# Patient Record
Sex: FEMALE | Race: WHITE | HISPANIC OR LATINO | Employment: FULL TIME | URBAN - METROPOLITAN AREA
[De-identification: names, ages, dates, MRNs, and addresses within clinical notes are randomized per-mention and may not be internally consistent; named-entity substitution may affect disease eponyms.]

---

## 2017-02-19 ENCOUNTER — ANESTHESIA EVENT (OUTPATIENT)
Dept: ENDOSCOPY | Facility: HOSPITAL | Age: 37
DRG: 872 | End: 2017-02-19
Payer: MEDICAID

## 2017-02-19 ENCOUNTER — SURGERY (OUTPATIENT)
Age: 37
End: 2017-02-19

## 2017-02-19 ENCOUNTER — ANESTHESIA (OUTPATIENT)
Dept: ENDOSCOPY | Facility: HOSPITAL | Age: 37
DRG: 872 | End: 2017-02-19
Payer: MEDICAID

## 2017-02-19 ENCOUNTER — HOSPITAL ENCOUNTER (INPATIENT)
Facility: HOSPITAL | Age: 37
LOS: 2 days | Discharge: HOME | DRG: 872 | End: 2017-02-21
Attending: HOSPITALIST | Admitting: HOSPITALIST
Payer: MEDICAID

## 2017-02-19 DIAGNOSIS — K80.51: ICD-10-CM

## 2017-02-19 DIAGNOSIS — K83.09 CHOLANGITIS: Primary | ICD-10-CM

## 2017-02-19 PROBLEM — A41.9 SEPSIS: Status: ACTIVE | Noted: 2017-02-19

## 2017-02-19 PROBLEM — R74.8 ABNORMAL LIVER ENZYMES: Status: ACTIVE | Noted: 2017-02-19

## 2017-02-19 LAB
ALBUMIN SERPL BCP-MCNC: 3.3 G/DL
ALP SERPL-CCNC: 96 U/L
ALT SERPL W/O P-5'-P-CCNC: 281 U/L
ANION GAP SERPL CALC-SCNC: 12 MMOL/L
AST SERPL-CCNC: 99 U/L
BASOPHILS # BLD AUTO: 0.01 K/UL
BASOPHILS NFR BLD: 0.1 %
BILIRUB DIRECT SERPL-MCNC: 4 MG/DL
BILIRUB SERPL-MCNC: 5.8 MG/DL
BUN SERPL-MCNC: 4 MG/DL
CALCIUM SERPL-MCNC: 8.8 MG/DL
CHLORIDE SERPL-SCNC: 104 MMOL/L
CO2 SERPL-SCNC: 19 MMOL/L
CREAT SERPL-MCNC: 0.7 MG/DL
DIFFERENTIAL METHOD: ABNORMAL
EOSINOPHIL # BLD AUTO: 0 K/UL
EOSINOPHIL NFR BLD: 0.1 %
ERYTHROCYTE [DISTWIDTH] IN BLOOD BY AUTOMATED COUNT: 13.6 %
EST. GFR  (AFRICAN AMERICAN): >60 ML/MIN/1.73 M^2
EST. GFR  (NON AFRICAN AMERICAN): >60 ML/MIN/1.73 M^2
GLUCOSE SERPL-MCNC: 124 MG/DL
HCT VFR BLD AUTO: 37.9 %
HGB BLD-MCNC: 12.8 G/DL
INR PPP: 1.1
LACTATE SERPL-SCNC: 1.4 MMOL/L
LYMPHOCYTES # BLD AUTO: 0.4 K/UL
LYMPHOCYTES NFR BLD: 3.7 %
MAGNESIUM SERPL-MCNC: 1.8 MG/DL
MCH RBC QN AUTO: 27.8 PG
MCHC RBC AUTO-ENTMCNC: 33.8 %
MCV RBC AUTO: 82 FL
MONOCYTES # BLD AUTO: 1 K/UL
MONOCYTES NFR BLD: 8.2 %
NEUTROPHILS # BLD AUTO: 10.4 K/UL
NEUTROPHILS NFR BLD: 87.6 %
PHOSPHATE SERPL-MCNC: 2 MG/DL
PLATELET # BLD AUTO: 144 K/UL
PMV BLD AUTO: 11.2 FL
POTASSIUM SERPL-SCNC: 3.4 MMOL/L
PROCALCITONIN SERPL IA-MCNC: 0.8 NG/ML
PROT SERPL-MCNC: 7.2 G/DL
PROTHROMBIN TIME: 11.8 SEC
RBC # BLD AUTO: 4.6 M/UL
SODIUM SERPL-SCNC: 135 MMOL/L
WBC # BLD AUTO: 11.9 K/UL

## 2017-02-19 PROCEDURE — 84100 ASSAY OF PHOSPHORUS: CPT

## 2017-02-19 PROCEDURE — 37000009 HC ANESTHESIA EA ADD 15 MINS: Performed by: INTERNAL MEDICINE

## 2017-02-19 PROCEDURE — 43262 ENDO CHOLANGIOPANCREATOGRAPH: CPT | Mod: ,,, | Performed by: INTERNAL MEDICINE

## 2017-02-19 PROCEDURE — 80048 BASIC METABOLIC PNL TOTAL CA: CPT

## 2017-02-19 PROCEDURE — 74328 X-RAY BILE DUCT ENDOSCOPY: CPT | Mod: 26,,, | Performed by: INTERNAL MEDICINE

## 2017-02-19 PROCEDURE — 80074 ACUTE HEPATITIS PANEL: CPT

## 2017-02-19 PROCEDURE — 87040 BLOOD CULTURE FOR BACTERIA: CPT

## 2017-02-19 PROCEDURE — 43264 ERCP REMOVE DUCT CALCULI: CPT | Mod: ,,, | Performed by: INTERNAL MEDICINE

## 2017-02-19 PROCEDURE — 37000008 HC ANESTHESIA 1ST 15 MINUTES: Performed by: INTERNAL MEDICINE

## 2017-02-19 PROCEDURE — 83735 ASSAY OF MAGNESIUM: CPT

## 2017-02-19 PROCEDURE — 36415 COLL VENOUS BLD VENIPUNCTURE: CPT

## 2017-02-19 PROCEDURE — 25000003 PHARM REV CODE 250: Performed by: NURSE PRACTITIONER

## 2017-02-19 PROCEDURE — 94761 N-INVAS EAR/PLS OXIMETRY MLT: CPT

## 2017-02-19 PROCEDURE — 43275 ERCP REMOVE FORGN BODY DUCT: CPT | Mod: ,,, | Performed by: INTERNAL MEDICINE

## 2017-02-19 PROCEDURE — 25000003 PHARM REV CODE 250: Performed by: HOSPITALIST

## 2017-02-19 PROCEDURE — 80076 HEPATIC FUNCTION PANEL: CPT

## 2017-02-19 PROCEDURE — 0FC98ZZ EXTIRPATION OF MATTER FROM COMMON BILE DUCT, VIA NATURAL OR ARTIFICIAL OPENING ENDOSCOPIC: ICD-10-PCS | Performed by: INTERNAL MEDICINE

## 2017-02-19 PROCEDURE — 84145 PROCALCITONIN (PCT): CPT

## 2017-02-19 PROCEDURE — 85610 PROTHROMBIN TIME: CPT

## 2017-02-19 PROCEDURE — 27200949 HC CANNULATOME: Performed by: INTERNAL MEDICINE

## 2017-02-19 PROCEDURE — 83605 ASSAY OF LACTIC ACID: CPT

## 2017-02-19 PROCEDURE — 63600175 PHARM REV CODE 636 W HCPCS: Performed by: HOSPITALIST

## 2017-02-19 PROCEDURE — 20000000 HC ICU ROOM

## 2017-02-19 PROCEDURE — C1769 GUIDE WIRE: HCPCS | Performed by: INTERNAL MEDICINE

## 2017-02-19 PROCEDURE — 63600175 PHARM REV CODE 636 W HCPCS: Performed by: ANESTHESIOLOGY

## 2017-02-19 PROCEDURE — 25000003 PHARM REV CODE 250: Performed by: ANESTHESIOLOGY

## 2017-02-19 PROCEDURE — 85025 COMPLETE CBC W/AUTO DIFF WBC: CPT

## 2017-02-19 PROCEDURE — 43264 ERCP REMOVE DUCT CALCULI: CPT | Performed by: INTERNAL MEDICINE

## 2017-02-19 PROCEDURE — 43275 ERCP REMOVE FORGN BODY DUCT: CPT | Performed by: INTERNAL MEDICINE

## 2017-02-19 PROCEDURE — 27200999 HC RETRIEVAL BALLOON, ERCP: Performed by: INTERNAL MEDICINE

## 2017-02-19 RX ORDER — GLYCOPYRROLATE 0.2 MG/ML
INJECTION INTRAMUSCULAR; INTRAVENOUS
Status: DISCONTINUED | OUTPATIENT
Start: 2017-02-19 | End: 2017-02-19

## 2017-02-19 RX ORDER — ROCURONIUM BROMIDE 10 MG/ML
INJECTION, SOLUTION INTRAVENOUS
Status: DISCONTINUED | OUTPATIENT
Start: 2017-02-19 | End: 2017-02-19

## 2017-02-19 RX ORDER — MIDAZOLAM HYDROCHLORIDE 1 MG/ML
INJECTION, SOLUTION INTRAMUSCULAR; INTRAVENOUS
Status: DISCONTINUED | OUTPATIENT
Start: 2017-02-19 | End: 2017-02-19

## 2017-02-19 RX ORDER — FENTANYL CITRATE 50 UG/ML
INJECTION, SOLUTION INTRAMUSCULAR; INTRAVENOUS
Status: DISCONTINUED | OUTPATIENT
Start: 2017-02-19 | End: 2017-02-19

## 2017-02-19 RX ORDER — ONDANSETRON 2 MG/ML
4 INJECTION INTRAMUSCULAR; INTRAVENOUS EVERY 8 HOURS PRN
Status: DISCONTINUED | OUTPATIENT
Start: 2017-02-19 | End: 2017-02-21 | Stop reason: HOSPADM

## 2017-02-19 RX ORDER — POTASSIUM CHLORIDE 20 MEQ/1
20 TABLET, EXTENDED RELEASE ORAL ONCE
Status: COMPLETED | OUTPATIENT
Start: 2017-02-19 | End: 2017-02-19

## 2017-02-19 RX ORDER — POTASSIUM CHLORIDE 20 MEQ/1
20 TABLET, EXTENDED RELEASE ORAL DAILY
Status: DISCONTINUED | OUTPATIENT
Start: 2017-02-19 | End: 2017-02-19

## 2017-02-19 RX ORDER — SUCCINYLCHOLINE CHLORIDE 20 MG/ML
INJECTION INTRAMUSCULAR; INTRAVENOUS
Status: DISCONTINUED | OUTPATIENT
Start: 2017-02-19 | End: 2017-02-19

## 2017-02-19 RX ORDER — LIDOCAINE HYDROCHLORIDE 20 MG/ML
INJECTION, SOLUTION EPIDURAL; INFILTRATION; INTRACAUDAL; PERINEURAL
Status: DISCONTINUED | OUTPATIENT
Start: 2017-02-19 | End: 2017-02-19

## 2017-02-19 RX ORDER — URSODIOL 250 MG/1
250 TABLET, FILM COATED ORAL
Status: DISCONTINUED | OUTPATIENT
Start: 2017-02-19 | End: 2017-02-21 | Stop reason: HOSPADM

## 2017-02-19 RX ORDER — MORPHINE SULFATE 4 MG/ML
4 INJECTION, SOLUTION INTRAMUSCULAR; INTRAVENOUS EVERY 4 HOURS PRN
Status: DISCONTINUED | OUTPATIENT
Start: 2017-02-19 | End: 2017-02-21 | Stop reason: HOSPADM

## 2017-02-19 RX ORDER — PROPOFOL 10 MG/ML
VIAL (ML) INTRAVENOUS
Status: DISCONTINUED | OUTPATIENT
Start: 2017-02-19 | End: 2017-02-19

## 2017-02-19 RX ORDER — MORPHINE SULFATE 2 MG/ML
2 INJECTION, SOLUTION INTRAMUSCULAR; INTRAVENOUS EVERY 4 HOURS PRN
Status: DISCONTINUED | OUTPATIENT
Start: 2017-02-19 | End: 2017-02-21 | Stop reason: HOSPADM

## 2017-02-19 RX ORDER — NEOSTIGMINE METHYLSULFATE 1 MG/ML
INJECTION, SOLUTION INTRAVENOUS
Status: DISCONTINUED | OUTPATIENT
Start: 2017-02-19 | End: 2017-02-19

## 2017-02-19 RX ORDER — ACETAMINOPHEN 325 MG/1
650 TABLET ORAL EVERY 4 HOURS PRN
Status: DISCONTINUED | OUTPATIENT
Start: 2017-02-19 | End: 2017-02-21 | Stop reason: HOSPADM

## 2017-02-19 RX ORDER — SODIUM CHLORIDE, SODIUM LACTATE, POTASSIUM CHLORIDE, CALCIUM CHLORIDE 600; 310; 30; 20 MG/100ML; MG/100ML; MG/100ML; MG/100ML
INJECTION, SOLUTION INTRAVENOUS CONTINUOUS PRN
Status: DISCONTINUED | OUTPATIENT
Start: 2017-02-19 | End: 2017-02-19

## 2017-02-19 RX ORDER — SODIUM CHLORIDE 9 MG/ML
INJECTION, SOLUTION INTRAVENOUS CONTINUOUS PRN
Status: DISCONTINUED | OUTPATIENT
Start: 2017-02-19 | End: 2017-02-19

## 2017-02-19 RX ORDER — SODIUM CHLORIDE 9 MG/ML
INJECTION, SOLUTION INTRAVENOUS CONTINUOUS
Status: DISCONTINUED | OUTPATIENT
Start: 2017-02-19 | End: 2017-02-20

## 2017-02-19 RX ORDER — HYDROMORPHONE HYDROCHLORIDE 2 MG/ML
INJECTION, SOLUTION INTRAMUSCULAR; INTRAVENOUS; SUBCUTANEOUS
Status: DISCONTINUED | OUTPATIENT
Start: 2017-02-19 | End: 2017-02-19

## 2017-02-19 RX ADMIN — MIDAZOLAM 2 MG: 1 INJECTION INTRAMUSCULAR; INTRAVENOUS at 12:02

## 2017-02-19 RX ADMIN — SODIUM CHLORIDE 1000 ML: 0.9 INJECTION, SOLUTION INTRAVENOUS at 07:02

## 2017-02-19 RX ADMIN — HYDROMORPHONE HYDROCHLORIDE 0.2 MG: 2 INJECTION INTRAMUSCULAR; INTRAVENOUS; SUBCUTANEOUS at 01:02

## 2017-02-19 RX ADMIN — ROCURONIUM BROMIDE 5 MG: 10 INJECTION, SOLUTION INTRAVENOUS at 01:02

## 2017-02-19 RX ADMIN — FENTANYL CITRATE 100 MCG: 50 INJECTION, SOLUTION INTRAMUSCULAR; INTRAVENOUS at 12:02

## 2017-02-19 RX ADMIN — HYDROMORPHONE HYDROCHLORIDE 0.2 MG: 2 INJECTION INTRAMUSCULAR; INTRAVENOUS; SUBCUTANEOUS at 02:02

## 2017-02-19 RX ADMIN — MORPHINE SULFATE 4 MG: 4 INJECTION, SOLUTION INTRAMUSCULAR; INTRAVENOUS at 08:02

## 2017-02-19 RX ADMIN — PROPOFOL 140 MG: 10 INJECTION, EMULSION INTRAVENOUS at 12:02

## 2017-02-19 RX ADMIN — ONDANSETRON 4 MG: 2 INJECTION INTRAMUSCULAR; INTRAVENOUS at 07:02

## 2017-02-19 RX ADMIN — ROCURONIUM BROMIDE 10 MG: 10 INJECTION, SOLUTION INTRAVENOUS at 01:02

## 2017-02-19 RX ADMIN — NEOSTIGMINE METHYLSULFATE 4 MG: 1 INJECTION INTRAVENOUS at 02:02

## 2017-02-19 RX ADMIN — GLYCOPYRROLATE 0.8 MG: 0.2 INJECTION, SOLUTION INTRAMUSCULAR; INTRAVENOUS at 02:02

## 2017-02-19 RX ADMIN — ACETAMINOPHEN 650 MG: 325 TABLET ORAL at 08:02

## 2017-02-19 RX ADMIN — PIPERACILLIN SODIUM AND TAZOBACTAM SODIUM 4.5 G: 4; .5 INJECTION, POWDER, FOR SOLUTION INTRAVENOUS at 08:02

## 2017-02-19 RX ADMIN — HYDROMORPHONE HYDROCHLORIDE 0.4 MG: 2 INJECTION INTRAMUSCULAR; INTRAVENOUS; SUBCUTANEOUS at 02:02

## 2017-02-19 RX ADMIN — MORPHINE SULFATE 2 MG: 2 INJECTION, SOLUTION INTRAMUSCULAR; INTRAVENOUS at 07:02

## 2017-02-19 RX ADMIN — PIPERACILLIN SODIUM AND TAZOBACTAM SODIUM 4.5 G: 4; .5 INJECTION, POWDER, FOR SOLUTION INTRAVENOUS at 03:02

## 2017-02-19 RX ADMIN — SODIUM CHLORIDE: 0.9 INJECTION, SOLUTION INTRAVENOUS at 12:02

## 2017-02-19 RX ADMIN — SODIUM CHLORIDE: 0.9 INJECTION, SOLUTION INTRAVENOUS at 10:02

## 2017-02-19 RX ADMIN — SUCCINYLCHOLINE CHLORIDE 140 MG: 20 INJECTION, SOLUTION INTRAMUSCULAR; INTRAVENOUS at 12:02

## 2017-02-19 RX ADMIN — LIDOCAINE HYDROCHLORIDE 100 MG: 20 INJECTION, SOLUTION EPIDURAL; INFILTRATION; INTRACAUDAL; PERINEURAL at 12:02

## 2017-02-19 RX ADMIN — HYDROMORPHONE HYDROCHLORIDE 0.6 MG: 2 INJECTION INTRAMUSCULAR; INTRAVENOUS; SUBCUTANEOUS at 02:02

## 2017-02-19 RX ADMIN — POTASSIUM CHLORIDE 20 MEQ: 20 TABLET, EXTENDED RELEASE ORAL at 11:02

## 2017-02-19 RX ADMIN — SODIUM CHLORIDE: 0.9 INJECTION, SOLUTION INTRAVENOUS at 11:02

## 2017-02-19 RX ADMIN — ROCURONIUM BROMIDE 15 MG: 10 INJECTION, SOLUTION INTRAVENOUS at 01:02

## 2017-02-19 RX ADMIN — SODIUM CHLORIDE, SODIUM LACTATE, POTASSIUM CHLORIDE, AND CALCIUM CHLORIDE: .6; .31; .03; .02 INJECTION, SOLUTION INTRAVENOUS at 01:02

## 2017-02-19 RX ADMIN — ACETAMINOPHEN 650 MG: 325 TABLET ORAL at 03:02

## 2017-02-19 RX ADMIN — SODIUM CHLORIDE: 0.9 INJECTION, SOLUTION INTRAVENOUS at 03:02

## 2017-02-19 RX ADMIN — ROCURONIUM BROMIDE 5 MG: 10 INJECTION, SOLUTION INTRAVENOUS at 12:02

## 2017-02-19 RX ADMIN — FENTANYL CITRATE 50 MCG: 50 INJECTION, SOLUTION INTRAMUSCULAR; INTRAVENOUS at 01:02

## 2017-02-19 NOTE — PLAN OF CARE
Patient arrived to unit via stretcher with EMS. Placed into bed 233. C/o abdominal and back pain 10/10. Dr. Sutton paged

## 2017-02-19 NOTE — SUBJECTIVE & OBJECTIVE
Past Medical History   Diagnosis Date    Calculus of bile duct with obstruction and without cholangitis or cholecystitis 2016    Choledocholithiasis     Cholelithiasis        Past Surgical History   Procedure Laterality Date    Cholecystectomy  2016     section      Endoscopic retrograde cholangiopancreatography w/ sphincterotomy and stone removal  2016       Review of patient's allergies indicates:  No Known Allergies    No current facility-administered medications on file prior to encounter.      Current Outpatient Prescriptions on File Prior to Encounter   Medication Sig    ondansetron (ZOFRAN-ODT) 4 MG TbDL Take 8 mg by mouth every 12 (twelve) hours.     Family History     Problem Relation (Age of Onset)    Gallbladder disease         Social History Main Topics    Smoking status: Never Smoker    Smokeless tobacco: Never Used    Alcohol use No    Drug use: No    Sexual activity: Yes     Partners: Male     Review of Systems   Constitutional: Positive for appetite change and fever. Negative for chills and diaphoresis.   HENT: Negative for sore throat and trouble swallowing.    Eyes: Negative for photophobia and visual disturbance.   Respiratory: Negative for cough, shortness of breath and wheezing.    Cardiovascular: Negative for chest pain and palpitations.   Gastrointestinal: Positive for abdominal pain, nausea and vomiting (Gold in color). Negative for constipation and diarrhea.   Endocrine: Negative for polydipsia and polyphagia.   Genitourinary: Negative for decreased urine volume, dysuria, hematuria and urgency.   Musculoskeletal: Negative for joint swelling, neck pain and neck stiffness.   Neurological: Negative for weakness, numbness and headaches.   Psychiatric/Behavioral: Negative for agitation, dysphoric mood and suicidal ideas.     Objective:     Vital Signs (Most Recent):  Temp: (!) 102.6 °F (39.2 °C) (17 0824)  Pulse: (!) 135 (17 0645)  Resp: 20 (17  0645)  BP: 120/75 (02/19/17 0645)  SpO2: 97 % (02/19/17 0758) Vital Signs (24h Range):  Temp:  [102.3 °F (39.1 °C)-102.6 °F (39.2 °C)] 102.6 °F (39.2 °C)  Pulse:  [135] 135  Resp:  [20] 20  SpO2:  [97 %] 97 %  BP: (120)/(75) 120/75     Weight: 104.8 kg (231 lb)  Body mass index is 42.25 kg/(m^2).    Physical Exam   Constitutional: She is oriented to person, place, and time. She appears well-developed and well-nourished. No distress.   HENT:   Head: Normocephalic and atraumatic.   Mouth/Throat: Oropharynx is clear and moist. No oropharyngeal exudate.   Eyes: Conjunctivae are normal. Pupils are equal, round, and reactive to light. No scleral icterus.   Neck: Neck supple.   Cardiovascular: Normal rate, regular rhythm, normal heart sounds and intact distal pulses.  Exam reveals no gallop and no friction rub.    No murmur heard.  Pulmonary/Chest: Effort normal and breath sounds normal. No respiratory distress. She has no wheezes. She has no rales.   Abdominal: Soft. Bowel sounds are normal. She exhibits no distension. There is tenderness. There is guarding. There is no rebound.   Musculoskeletal: She exhibits no edema, tenderness or deformity.   Neurological: She is alert and oriented to person, place, and time. She exhibits normal muscle tone.   Skin: Skin is warm and dry. No rash noted. She is not diaphoretic.   Psychiatric: She has a normal mood and affect. Her behavior is normal.   Nursing note and vitals reviewed.       Significant Labs:   CBC:   Recent Labs  Lab 02/19/17  0756   WBC 11.90   HGB 12.8   HCT 37.9   *     CMP:   Recent Labs  Lab 02/19/17  0756   *   K 3.4*      CO2 19*   *   BUN 4*   CREATININE 0.7   CALCIUM 8.8   PROT 7.2   ALBUMIN 3.3*   BILITOT 5.8*   ALKPHOS 96   AST 99*   *   ANIONGAP 12   EGFRNONAA >60     Coagulation:   Recent Labs  Lab 02/19/17  0758   INR 1.1       Significant Imaging:   Imaging Report From Outside Facility:  CT Abdomen  2/19/17  Impressions:  1. Internal biliary stent, partially migrated, is within the distal common bile duct and 2nd portion of the duodenum.  There is mild intra-and extra hepatic biliary ductal dilation. Consider stent occlusion resulting in biliary obstruction.  2. No abdominal pelvic inflammation, free fluid, free air, or bowel obstruction. Normal appendix  Electronically signed by Eugene Fang MD

## 2017-02-19 NOTE — PLAN OF CARE
02/19/17 1142   Discharge Assessment   Assessment Type Discharge Planning Assessment   Confirmed/corrected address and phone number on facesheet? Yes   Assessment information obtained from? Patient   If Healthcare Directive is received, is it scanned into Epic? yes   Prior to hospitilization cognitive status: Alert/Oriented   Prior to hospitalization functional status: Independent   Current cognitive status: Alert/Oriented   Current Functional Status: Independent   Arrived From home or self-care   Lives With other (see comments)  (uncle)   Able to Return to Prior Arrangements yes   Is patient able to care for self after discharge? Yes   How many people do you have in your home that can help with your care after discharge? 1   Patient's perception of discharge disposition home or selfcare   Readmission Within The Last 30 Days no previous admission in last 30 days   Patient currently being followed by outpatient case management? No   Patient currently receives home health services? No   Does the patient currently use HME? No   Patient currently receives private duty nursing? No   Patient currently receives any other outside agency services? No   Equipment Currently Used at Home none   Do you have any problems affording any of your prescribed medications? Yes   If yes, what medications? all medication,    Is the patient taking medications as prescribed? no   Do you have any financial concerns preventing you from receiving the healthcare you need? Yes   Does the patient receive services at the [facility]? No   Are there any open cases? No   Discharge Plan A Home with family   Discharge Plan B Home with family   Patient/Family In Agreement With Plan yes

## 2017-02-19 NOTE — PLAN OF CARE
Problem: Patient Care Overview  Goal: Plan of Care Review  Outcome: Ongoing (interventions implemented as appropriate)  Pt on RA, SPO2 97 %.  Pt in no apparent respiratory distress. Will continue to monitor.

## 2017-02-19 NOTE — ANESTHESIA PREPROCEDURE EVALUATION
02/19/2017  Nel Ayala is a 36 y.o., female. For ercp due to bile duct stricture    [facility] Pre-op Assessment    I have reviewed the Patient Summary Reports.     I have reviewed the Nursing Notes.      Review of Systems  Anesthesia Hx:  No problems with previous Anesthesia Denies Hx of Anesthetic complications  History of prior surgery of interest to airway management or planning: Denies Family Hx of Anesthesia complications.   Denies Personal Hx of Anesthesia complications.   Social:  Non-Smoker, No Alcohol Use    Hematology/Oncology:  Hematology Normal        Cardiovascular:  Cardiovascular Normal Exercise tolerance: good     Pulmonary:  Pulmonary Normal    Renal/:  Renal/ Normal     Hepatic/GI:   Bile duct stricture   Musculoskeletal:  Musculoskeletal Normal    OB/GYN/PEDS:  upt pending   Neurological:  Neurology Normal    Endocrine:  Endocrine Normal        Physical Exam  General:  Obesity    Airway/Jaw/Neck:  Airway Findings: Mouth Opening: Normal Tongue: Normal  General Airway Assessment: Adult  Mallampati: III      Dental:  Dental Findings: In tact   Chest/Lungs:  Chest/Lungs Findings: Clear to auscultation, Normal Respiratory Rate     Heart/Vascular:  Heart Findings: Rate: Normal  Rhythm: Regular Rhythm  Sounds: Normal        Mental Status:  Mental Status Findings:  Cooperative, Alert and Oriented         Anesthesia Plan  Type of Anesthesia, risks & benefits discussed:  Anesthesia Type:  general  Patient's Preference:   Intra-op Monitoring Plan:   Intra-op Monitoring Plan Comments:   Post Op Pain Control Plan:   Post Op Pain Control Plan Comments:   Induction:   IV  Beta Blocker:         Informed Consent: Patient understands risks and agrees with Anesthesia plan.  Questions answered.   ASA Score: 2  emergent   Day of Surgery Review of History & Physical:    H&P update referred to the provider.          Ready For Surgery From Anesthesia Perspective.     Lab Results   Component Value Date    WBC 11.90 02/19/2017    HGB 12.8 02/19/2017    HCT 37.9 02/19/2017     (L) 02/19/2017     (H) 02/19/2017    AST 99 (H) 02/19/2017     (L) 02/19/2017    K 3.4 (L) 02/19/2017     02/19/2017    CREATININE 0.7 02/19/2017    BUN 4 (L) 02/19/2017    CO2 19 (L) 02/19/2017    INR 1.1 02/19/2017

## 2017-02-19 NOTE — LETTER
February 21, 2017         [address] [place] [address] [place] [address]  Phone: [phone]  Fax: [phone]       Patient: Nel Ayala   YOB: 1980  Date of Visit: 02/21/2017    To Whom It May Concern:    Please excuse Tio Kay who was with the above person who was hospitalized at [facility] on from 2/19/2017 -  02/21/2017. He may return to work on Wednesday, February 22 with no restrictions. If you have any questions or concerns, or if I can be of further assistance, please do not hesitate to contact me.    Sincerely,        Sandie Payne PA-C  [facility]  Pager: [phone]

## 2017-02-19 NOTE — H&P
[facility]  History & Physical    Patient Name: Nel Ayala  MRN: [id number]  Admission Date: 2/19/2017  Attending Physician: Johnathan Sutton MD   Primary Care Provider: Primary Doctor No         Patient information was obtained from patient, past medical records and ER records.     Subjective:     Principal Problem:Cholangitis    Chief Complaint: No chief complaint on file.       HPI: Nel Ayala is a 36 year old  women, mother of 3 children with a history of cholecystectomy, choledocholithiasis with temporary stent placement and no other medical history and takes no medications.  She takes depot-provera injections for birth control.  She has admitted in January 2016 for choledocholithiasis, s/p cholecystectomy 1/18/16 with J-P drain and subsequent elevated LFTs, underwent ERCP 1/19/16 with sphincterotomy and temporary plastic stent placement. She has been unable to follow up with  secondary to insurance problems.  She presented to [facility] ER with abdominal pain, nausea for 2 days and was found to have jaundice. Her bilirubin was 6, , , WBC 13K, Creatinine 0.7. Her vitals were 106/57, , Temp 99F. A CT scan was performed and it as reported that the CBD stent has migrated into the wrong position. She was given 2 liters of Normal Saline. She was transferred to [facility], admitted to [facility] for cholangitis with sepsis and elevated liver enzymes.    Upon arrival, her temperature was 102.3 F and HR is 130's (Sinus Tachycardia) and complaining of 10/10 abdominal pain.  She is given a 1 liter normal saline bolus, followed by 125 ml.hr and started on Zosyn and treated with zofran and morphine for pain and nausea.  GI consult today. Repeat labs are pending.       Past Medical History   Diagnosis Date    Calculus of bile duct with obstruction and without cholangitis or cholecystitis 1/20/2016     Choledocholithiasis     Cholelithiasis        Past Surgical History   Procedure Laterality Date    Cholecystectomy  2016     section      Endoscopic retrograde cholangiopancreatography w/ sphincterotomy and stone removal  2016       Review of patient's allergies indicates:  No Known Allergies    No current facility-administered medications on file prior to encounter.      Current Outpatient Prescriptions on File Prior to Encounter   Medication Sig    ondansetron (ZOFRAN-ODT) 4 MG TbDL Take 8 mg by mouth every 12 (twelve) hours.     Family History     Problem Relation (Age of Onset)    Gallbladder disease         Social History Main Topics    Smoking status: Never Smoker    Smokeless tobacco: Never Used    Alcohol use No    Drug use: No    Sexual activity: Yes     Partners: Male     Review of Systems   Constitutional: Positive for appetite change and fever. Negative for chills and diaphoresis.   HENT: Negative for sore throat and trouble swallowing.    Eyes: Negative for photophobia and visual disturbance.   Respiratory: Negative for cough, shortness of breath and wheezing.    Cardiovascular: Negative for chest pain and palpitations.   Gastrointestinal: Positive for abdominal pain, nausea and vomiting (Gold in color). Negative for constipation and diarrhea.   Endocrine: Negative for polydipsia and polyphagia.   Genitourinary: Negative for decreased urine volume, dysuria, hematuria and urgency.   Musculoskeletal: Negative for joint swelling, neck pain and neck stiffness.   Neurological: Negative for weakness, numbness and headaches.   Psychiatric/Behavioral: Negative for agitation, dysphoric mood and suicidal ideas.     Objective:     Vital Signs (Most Recent):  Temp: (!) 102.6 °F (39.2 °C) (17 0824)  Pulse: (!) 135 (17 0645)  Resp: 20 (17 0645)  BP: 120/75 (17 0645)  SpO2: 97 % (17 0758) Vital Signs (24h Range):  Temp:  [102.3 °F (39.1 °C)-102.6 °F (39.2 °C)]  102.6 °F (39.2 °C)  Pulse:  [135] 135  Resp:  [20] 20  SpO2:  [97 %] 97 %  BP: (120)/(75) 120/75     Weight: 104.8 kg (231 lb)  Body mass index is 42.25 kg/(m^2).    Physical Exam   Constitutional: She is oriented to person, place, and time. She appears well-developed and well-nourished. No distress.   HENT:   Head: Normocephalic and atraumatic.   Mouth/Throat: Oropharynx is clear and moist. No oropharyngeal exudate.   Eyes: Conjunctivae are normal. Pupils are equal, round, and reactive to light. No scleral icterus.   Neck: Neck supple.   Cardiovascular: Normal rate, regular rhythm, normal heart sounds and intact distal pulses.  Exam reveals no gallop and no friction rub.    No murmur heard.  Pulmonary/Chest: Effort normal and breath sounds normal. No respiratory distress. She has no wheezes. She has no rales.   Abdominal: Soft. Bowel sounds are normal. She exhibits no distension. There is tenderness. There is guarding. There is no rebound.   Musculoskeletal: She exhibits no edema, tenderness or deformity.   Neurological: She is alert and oriented to person, place, and time. She exhibits normal muscle tone.   Skin: Skin is warm and dry. No rash noted. She is not diaphoretic.   Psychiatric: She has a normal mood and affect. Her behavior is normal.   Nursing note and vitals reviewed.       Significant Labs:   CBC:   Recent Labs  Lab 02/19/17  0756   WBC 11.90   HGB 12.8   HCT 37.9   *     CMP:   Recent Labs  Lab 02/19/17  0756   *   K 3.4*      CO2 19*   *   BUN 4*   CREATININE 0.7   CALCIUM 8.8   PROT 7.2   ALBUMIN 3.3*   BILITOT 5.8*   ALKPHOS 96   AST 99*   *   ANIONGAP 12   EGFRNONAA >60     Coagulation:   Recent Labs  Lab 02/19/17  0758   INR 1.1       Significant Imaging:   Imaging Report From Outside Facility:  CT Abdomen 2/19/17  Impressions:  1. Internal biliary stent, partially migrated, is within the distal common bile duct and 2nd portion of the duodenum.  There is mild  intra-and extra hepatic biliary ductal dilation. Consider stent occlusion resulting in biliary obstruction.  2. No abdominal pelvic inflammation, free fluid, free air, or bowel obstruction. Normal appendix  Electronically signed by Eugene Fang MD    Assessment/Plan:     S/P cholecystectomy 1/18/16  , [facility]      Sepsis  Cholangitis, Abnormal Liver Enzymes  S/P Temporary Stent placment on 1/20/16 (Dr. Portillo) Patient unable to follow for ERCP due to financial concerns.  ABD CT shows stent migration with mild intra-and extra hepatic biliary ductal dilation.  Her bilirubin was 6, , , WBC 13K, Creatinine 0.7.  She was tachycardic with a temperature of 102.3, and 10/10 pain upon admits.  She is getting an additional liter of Normal Saline, then infusion at 125 ml/hr. NPO, PRN pain and nausea medicine. GI consult for ERCP.         VTE Risk Mitigation         Ordered     Medium Risk of VTE  Once      02/19/17 0717     Place sequential compression device  Until discontinued      02/19/17 0717     Place DARYA hose  Until discontinued      02/19/17 0717        Lissy Garcia NP  Department of Hospital Medicine   [facility]

## 2017-02-19 NOTE — CONSULTS
[facility]    CC: abdominal pain    HPI 36 y.o. female with PMH cholecystectomy, biliary stricture and choledocholithiasis s/p temporary CBD stent placement by Dr. Portillo 16 who presents with complaints of epigastric abdominal pain associated with nausea but no vomiting. She had a hospitalization in 2016 for choledocholithiasis and had CCY 16 and subsequent elevation in LFTs. She had ERCP on 16 and at that time the CBD stent was placed. She presented to an OSH with the abdominal pain, nausea and jaundice. Her bilirubin was elevated at 6, , . CT A/P was concerning for stent migration. She was transferred for concern for cholangitis given persistent fevers and tachycardia. She was placed on Zosyn and given total 3 L NS.       Past Medical History   Diagnosis Date    Calculus of bile duct with obstruction and without cholangitis or cholecystitis 2016    Choledocholithiasis     Cholelithiasis          Past Surgical History   Procedure Laterality Date    Cholecystectomy  2016     section      Endoscopic retrograde cholangiopancreatography w/ sphincterotomy and stone removal  2016       Social History  Social History   Substance Use Topics    Smoking status: Never Smoker    Smokeless tobacco: Never Used    Alcohol use No         Family History   Problem Relation Age of Onset    Gallbladder disease         Review of Systems  General ROS: negative for chills, fever or weight loss  Psychological ROS: negative for hallucination, depression or suicidal ideation  Ophthalmic ROS: negative for blurry vision, photophobia or eye pain  ENT ROS: negative for epistaxis, sore throat or rhinorrhea  Respiratory ROS: no cough, shortness of breath, or wheezing  Cardiovascular ROS: no chest pain or dyspnea on exertion  Gastrointestinal ROS: +abdominal pain, change in bowel habits, or black/ bloody stools  Genito-Urinary ROS: no dysuria, trouble voiding, or  "hematuria  Musculoskeletal ROS: negative for gait disturbance or muscular weakness  Neurological ROS: no syncope or seizures; no ataxia  Dermatological ROS: negative for pruritis, rash and jaundice    Physical Examination  Visit Vitals    /75    Pulse (!) 135    Temp (!) 102.6 °F (39.2 °C)    Resp 20    Ht 5' 2" (1.575 m)    Wt 104.8 kg (231 lb)    SpO2 97%    BMI 42.25 kg/m2     General appearance: alert, cooperative, no distress, +jaundice  HENT: Normocephalic, atraumatic, neck symmetrical, no nasal discharge   Eyes: +scleral icterus, PERRL, EOM's intact  Lungs: clear to auscultation bilaterally, no dullness to percussion bilaterally  Heart: regular rate and rhythm without rub; no displacement of the PMI   Abdomen: soft, tender to palpation,  bowel sounds normoactive; no organomegaly  Extremities: extremities symmetric; no clubbing, cyanosis, or edema  Integument: +jaundice, otherwise normal texture, turgor normal; no rashes; hair distrubution normal  Neurologic: Alert and oriented X 3, normal strength, normal coordination and gait  Psychiatric: no pressured speech; normal affect; no evidence of impaired cognition     Labs:  WBC 11.9  H/H 12.8/37.9  Plt 144  INR 1.1  Tbili 5.8, Dbili 4  AST 99,     Imaging:  CT A/P from OSH:     Assessment:   36 y.o. female with PMH biliary stricture and choledocholithiasis s/p temporary CBD stent placement by Dr. Portillo 1/20/16 who presents with complaints of right sided and epigastric abdominal pain, fevers and hyperbilirubinemia with bilirubin 6 concerning for ascending cholangitis.    Plan:   -Strict NPO, IVFs  -Hold anti-coagulation  -Follow up blood cultures  -Agree with antibiotic coverage for gram negative and anaerobic organisms with Zosyn  -Aggressive IVF resuscitation underway  -Dr. Portillo to review case with plan to perform ERCP today    Jose R Mart MD  [facility]  Pager [phone]    "

## 2017-02-19 NOTE — ASSESSMENT & PLAN NOTE
Cholangitis, Choledochlithiasis,  Abnormal Liver Enzymes  S/P Temporary Stent placment on 1/20/16 (Dr. Portillo) Patient unable to follow for ERCP due to financial concerns.  ABD CT shows stent migration with mild intra-and extra hepatic biliary ductal dilation.  Her bilirubin was 6, , , WBC 13K, Creatinine 0.7.  She was tachycardic with a temperature of 102.3, and 10/10 pain upon admits.  She is getting an additional liter of Normal Saline, then infusion at 125 ml/hr. IV Zosyn.    Emergent ERCP by Dr. Portillo. Occluded stent, sludge, pus and stones were removed via sphincterotomy. She was transferred to ICU for Sepsis monitoring. Liver enzymes improving.    Tolerated Clear liquid diet, will advance as tolerated.  Started Usodial 250 mg TID with meals. Will discharge with 7 days of oral Ciprofloxacin.

## 2017-02-19 NOTE — IP AVS SNAPSHOT
[facility]  [address]  [place] [address]  Phone: [phone]           Patient Discharge Instructions     Our goal is to set you up for success. This packet includes information on your condition, medications, and your home care. It will help you to care for yourself so you don't get sicker and need to go back to the hospital.     Please ask your nurse if you have any questions.        There are many details to remember when preparing to leave the hospital. Here is what you will need to do:    1. Take your medicine. If you are prescribed medications, review your Medication List in the following pages. You may have new medications to  at the pharmacy and others that you'll need to stop taking. Review the instructions for how and when to take your medications. Talk with your doctor or nurses if you are unsure of what to do.     2. Go to your follow-up appointments. Specific follow-up information is listed in the following pages. Your may be contacted by a transition nurse or clinical provider about future appointments. Be sure we have all of the phone numbers to reach you, if needed. Please contact your provider's office if you are unable to make an appointment.     3. Watch for warning signs. Your doctor or nurse will give you detailed warning signs to watch for and when to call for assistance. These instructions may also include educational information about your condition. If you experience any of warning signs to your health, call your doctor.               ** Verify the list of medication(s) below is accurate and up to date. Carry this with you in case of emergency. If your medications have changed, please notify your healthcare provider.             Medication List      START taking these medications        Additional Info                      ciprofloxacin HCl 500 MG tablet   Commonly known as:  CIPRO   Quantity:  14 tablet   Refills:  0   Dose:  500 mg    Instructions:  Take 1  tablet (500 mg total) by mouth 2 (two) times daily.     Begin Date    AM    Noon    PM    Bedtime       metronidazole 500 MG tablet   Commonly known as:  FLAGYL   Quantity:  21 tablet   Refills:  0   Dose:  500 mg    Instructions:  Take 1 tablet (500 mg total) by mouth 3 (three) times daily.     Begin Date    AM    Noon    PM    Bedtime       naproxen 500 MG tablet   Commonly known as:  NAPROSYN   Quantity:  10 tablet   Refills:  0   Dose:  500 mg    Last time this was given:  500 mg on 2/20/2017  7:33 PM   Instructions:  Take 1 tablet (500 mg total) by mouth 2 (two) times daily as needed (headache).     Begin Date    AM    Noon    PM    Bedtime       ursodiol 250 mg Tab   Commonly known as:  ACTIGALL   Quantity:  90 tablet   Refills:  1   Dose:  250 mg    Last time this was given:  250 mg on 2/21/2017 10:51 AM   Instructions:  Take 1 tablet (250 mg total) by mouth 3 (three) times daily with meals.     Begin Date    AM    Noon    PM    Bedtime         CHANGE how you take these medications        Additional Info                      ondansetron 4 MG Tbdl   Commonly known as:  ZOFRAN-ODT   Refills:  0   Dose:  8 mg   What changed:    - when to take this  - reasons to take this    Instructions:  Take 2 tablets (8 mg total) by mouth every 12 (twelve) hours as needed (nausea).     Begin Date    AM    Noon    PM    Bedtime            Where to Get Your Medications      You can get these medications from any pharmacy     Bring a paper prescription for each of these medications     ciprofloxacin HCl 500 MG tablet    metronidazole 500 MG tablet    naproxen 500 MG tablet    ursodiol 250 mg Tab         Information about where to get these medications is not yet available     ! Ask your nurse or doctor about these medications     ondansetron 4 MG Tbdl                  Please bring to all follow up appointments:    1. A copy of your discharge instructions.  2. All medicines you are currently taking in their original  bottles.  3. Identification and insurance card.    Please arrive 15 minutes ahead of scheduled appointment time.    Please call 24 hours in advance if you must reschedule your appointment and/or time.        Follow-up Information     Follow up with David Portillo MD.    Specialty:  Gastroenterology    Why:  This clinic will contact you with your follow up date and time.    Contact information:    [address] [address]  [phone]          Discharge Instructions     Future Orders    Activity as tolerated     Call MD for:  persistent nausea and vomiting or diarrhea     Call MD for:  severe uncontrolled pain     Call MD for:  temperature >100.4     Diet general     Questions:    Total calories:      Fat restriction, if any:      Protein restriction, if any:      Na restriction, if any:      Fluid restriction:      Additional restrictions:          Discharge Instructions       Continue low-fat, bland diet for the next 5 days.  Advance as tolerated.      CIPROFLOXACIN TABLETS (ENGLISH) View Edit Remove  NAPROXEN SODIUM ORAL TABLET, EXTENDED-RELEASE (ENGLISH) View Edit Remove  URSODEOXYCHOLIC ACID, URSODIOL CAPSULES OR TABLETS (ENGLISH) View Edit Remove  METRONIDAZOLE TABLETS OR CAPSULES (ENGLISH) View Edit Remove  ABDOMINAL PAIN, ADULT (ENGLISH) View Edit Remove        Discharge References/Attachments     CIPROFLOXACIN TABLETS (ENGLISH)    NAPROXEN SODIUM ORAL TABLET, EXTENDED-RELEASE (ENGLISH)    URSODEOXYCHOLIC ACID, URSODIOL CAPSULES OR TABLETS (ENGLISH)    METRONIDAZOLE TABLETS OR CAPSULES (ENGLISH)    ABDOMINAL PAIN, ADULT (ENGLISH)        Primary Diagnosis     Your primary diagnosis was:  Gallstone Of Bile Duct      Admission Information     Date & Time Provider Department [facility]    2/19/2017  6:57 AM Dae Escobedo MD [facility] [phone]      Care Providers     Provider Role Specialty Primary office phone    Dae Escobedo MD Attending Provider Hospitalist  "[phone]    Edith Lu MD Consulting Physician  Gastroenterology [phone]    David Portillo MD Consulting Physician  Gastroenterology [phone]    David Portillo MD Surgeon  Gastroenterology [phone]      Your Vitals Were     BP Pulse Temp Resp Height Weight    109/74 69 98.3 °F (36.8 °C) (Oral) 18 5' 2" (1.575 m) 106.2 kg (234 lb 2.1 oz)    Last Period SpO2 BMI          01/16/2017 (Exact Date) 99% 42.82 kg/m2        Recent Lab Values     No lab values to display.      Pending Labs     Order Current Status    Blood culture (site 1) Preliminary result    Blood culture (site 2) Preliminary result      Allergies as of 2/21/2017     No Known Allergies      [facility]     [facility] Nurse Care Line - 24/7 Assistance  Unless otherwise directed by your provider, please contact [facility], our nurse care line that is available for 24/7 assistance.     Registered nurses in the [facility] provide clinical advisement, health education, appointment booking, and other advisory services.  Call for this free service at [phone].        Advance Directives     An advance directive is a document which, in the event you are no longer able to make decisions for yourself, tells your healthcare team what kind of treatment you do or do not want to receive, or who you would like to make those decisions for you.  If you do not currently have an advance directive, [facility] encourages you to create one.  For more information call:  [phone]), [phone] ([phone]),  or log on to [email or web address].        Language Assistance Services     ATTENTION: Language assistance services are available, free of charge. Please call [phone].      ATENCIÓN: Si habla español, tiene a silva disposición servicios gratuitos de asistencia lingüística. Llame al [phone].     CHÚ Ý: N?u b?n nói Ti?ng Vi?t, có các d?ch v? h? tr? ngôn ng? mi?n phí dành cho b?n. G?i s? " [phone].        [facility] Sign-Up     Activating your [facility] account is as easy as 1-2-3!     1) Visit [email or web address], select Sign Up Now, enter this activation code and your date of birth, then select Next.  [email or web address]  Expires: 4/7/2017 12:04 PM      2) Create a username and password to use when you visit [facility] in the future and select a security question in case you lose your password and select Next.    3) Enter your e-mail address and click Sign Up!    Additional Information  If you have questions, please e-mail [email or web address] or call [phone] to talk to our [facility] staff. Remember, [facility] is NOT to be used for urgent needs. For medical emergencies, dial 911.          [facility] complies with applicable Federal civil rights laws and does not discriminate on the basis of race, color, national origin, age, disability, or sex.

## 2017-02-19 NOTE — TRANSFER OF CARE
"Anesthesia Transfer of Care Note    Patient: Nel Ayala    Procedure(s) Performed: Procedure(s) (LRB):  ERCP (N/A)    Patient location: ICU    Anesthesia Type: general    Transport from OR: Transported from OR on 6-10 L/min O2 by face mask with adequate spontaneous ventilation. Continuous ECG monitoring in transport. Continuous SpO2 monitoring in transport    Post pain: adequate analgesia    Post assessment: no apparent anesthetic complications    Post vital signs: stable    Level of consciousness: awake, alert and oriented    Nausea/Vomiting: no nausea/vomiting    Complications: none          Last vitals:   Visit Vitals    /71    Pulse (!) 126    Temp 36.8 °C (98.3 °F) (Oral)    Resp 18    Ht 5' 2" (1.575 m)    Wt 104.8 kg (231 lb)    LMP 01/16/2017 (Exact Date)    SpO2 97%    Breastfeeding No    BMI 42.25 kg/m2     "

## 2017-02-19 NOTE — PLAN OF CARE
Admitted from ERCP,, pt awake and alert, oriented, except to time, reoriented, informed of what they did in the procedure, denies pain or nausea at this time, ap 124 st bp 100/58, lindsay 16, unlabored, lungs clear, simple face mask on 6lnc, sat's 98%,abd obese, soft, hypoactive bowel sounds, no co of pain, nausea, even with palpitation, temp 101.1 orally , at present L/R infusing set to 125cc hr as per previous fld orders, awaiting orders, sulmad R Anibal NP for orders        1515 mask off sat's on room air, 93%, 02 nc 2l placed, still denies pain

## 2017-02-19 NOTE — ASSESSMENT & PLAN NOTE
Cholangitis, Abnormal Liver Enzymes  S/P Temporary Stent placment on 1/20/16 (Dr. Portillo) Patient unable to follow for ERCP due to financial concerns.  ABD CT shows stent migration with mild intra-and extra hepatic biliary ductal dilation.  Her bilirubin was 6, , , WBC 13K, Creatinine 0.7.  She was tachycardic with a temperature of 102.3, and 10/10 pain upon admits.  She is getting an additional liter of Normal Saline, then infusion at 125 ml/hr. IV Zosyn. NPO, PRN pain and nausea medicine. GI consult for ERCP. Rechecking labs.

## 2017-02-19 NOTE — OR NURSING
Utilized the language line for interpretation; updated patient's spouse on procedure and offered to answer any questions--denies questions.

## 2017-02-19 NOTE — PROGRESS NOTES
Outside Transfer Summary Note    Transferring Physician or Mid Level Provider/Speciality: Dr. Gustafson - ED    Transferring Facility/Hospital: [facility]    Accepting Physician/NAVID: Johnathan Sutton     Date of Acceptance: 02/19/2017     Code Status: Full    Allergies: NKDA    Reason for Transfer to [facility]: GI evaluation    Report from Transferring Physician or Mid-Level provider/Hospital course: Ms. Ayala is a 35 yo HF that presented to OSH with abdominal pain, nausea and was found to have jaundice. Her bilirubin was 6, , , WBC 13K, Creatinine 0.7. Her vitals were 106/57, , Temp 99F. A CT scan was performed and it as reported that the CBD stent has migrated into the wrong position. She had the stent placed in January 2016 for choledocolithasis and biliary stricture.       Please call [facility] upon patient arrival to floor.      Johnathan Sutton MD  Spectralinks: [phone] / [phone]

## 2017-02-19 NOTE — ANESTHESIA POSTPROCEDURE EVALUATION
"Anesthesia Post Evaluation    Patient: Nel Ayala    Procedure(s) Performed: Procedure(s) (LRB):  ERCP (N/A)    Final Anesthesia Type: general  Patient location during evaluation: ICU  Patient participation: Yes- Able to Participate  Level of consciousness: awake and alert  Post-procedure vital signs: reviewed and stable  Pain management: adequate  Airway patency: patent  PONV status at discharge: No PONV  Anesthetic complications: no      Cardiovascular status: tachycardic  Respiratory status: unassisted and spontaneous ventilation  Hydration status: euvolemic  Follow-up not needed.        Visit Vitals    /71    Pulse (!) 126    Temp 36.8 °C (98.3 °F) (Oral)    Resp 18    Ht 5' 2" (1.575 m)    Wt 104.8 kg (231 lb)    LMP 01/16/2017 (Exact Date)    SpO2 97%    Breastfeeding No    BMI 42.25 kg/m2       Pain/Shirin Score: Pain Assessment Performed: Yes (2/19/2017  7:17 AM)  Presence of Pain: complains of pain/discomfort (2/19/2017  7:17 AM)  Pain Rating Prior to Med Admin: 8 (2/19/2017  8:48 AM)  Pain Rating Post Med Admin: 6 (2/19/2017  8:07 AM)      "

## 2017-02-19 NOTE — PLAN OF CARE
Not hungry for liquids at this time, maybe later, PRETTY Garcia NP here, informed of status, no co of pain or nausea, temp down 99. 6, and not hungry for liquids, asked if the urosodol to be given anyway, stated to just hold til am when eating, also heart rate down 110- st

## 2017-02-20 PROBLEM — K80.33 CALCULUS OF BILE DUCT WITH ACUTE CHOLANGITIS WITH OBSTRUCTION: Status: ACTIVE | Noted: 2017-02-19

## 2017-02-20 LAB
ALBUMIN SERPL BCP-MCNC: 2.6 G/DL
ALP SERPL-CCNC: 84 U/L
ALT SERPL W/O P-5'-P-CCNC: 160 U/L
ANION GAP SERPL CALC-SCNC: 10 MMOL/L
AST SERPL-CCNC: 37 U/L
B-HCG UR QL: NEGATIVE
BASOPHILS # BLD AUTO: 0.01 K/UL
BASOPHILS NFR BLD: 0.1 %
BILIRUB DIRECT SERPL-MCNC: 3.6 MG/DL
BILIRUB SERPL-MCNC: 4.9 MG/DL
BUN SERPL-MCNC: 4 MG/DL
CALCIUM SERPL-MCNC: 8.4 MG/DL
CHLORIDE SERPL-SCNC: 109 MMOL/L
CO2 SERPL-SCNC: 19 MMOL/L
CREAT SERPL-MCNC: 0.6 MG/DL
CTP QC/QA: YES
DIFFERENTIAL METHOD: ABNORMAL
EOSINOPHIL # BLD AUTO: 0.2 K/UL
EOSINOPHIL NFR BLD: 2.2 %
ERYTHROCYTE [DISTWIDTH] IN BLOOD BY AUTOMATED COUNT: 13.7 %
EST. GFR  (AFRICAN AMERICAN): >60 ML/MIN/1.73 M^2
EST. GFR  (NON AFRICAN AMERICAN): >60 ML/MIN/1.73 M^2
GLUCOSE SERPL-MCNC: 102 MG/DL
HAV IGM SERPL QL IA: NEGATIVE
HBV CORE IGM SERPL QL IA: NEGATIVE
HBV SURFACE AG SERPL QL IA: NEGATIVE
HCT VFR BLD AUTO: 34.4 %
HCV AB SERPL QL IA: NEGATIVE
HGB BLD-MCNC: 11.4 G/DL
LYMPHOCYTES # BLD AUTO: 1 K/UL
LYMPHOCYTES NFR BLD: 13 %
MAGNESIUM SERPL-MCNC: 1.9 MG/DL
MCH RBC QN AUTO: 27.5 PG
MCHC RBC AUTO-ENTMCNC: 33.1 %
MCV RBC AUTO: 83 FL
MONOCYTES # BLD AUTO: 1.2 K/UL
MONOCYTES NFR BLD: 15 %
NEUTROPHILS # BLD AUTO: 5.3 K/UL
NEUTROPHILS NFR BLD: 69.6 %
PHOSPHATE SERPL-MCNC: 1.3 MG/DL
PLATELET # BLD AUTO: 122 K/UL
PMV BLD AUTO: 11.2 FL
POTASSIUM SERPL-SCNC: 3.2 MMOL/L
PROT SERPL-MCNC: 6.3 G/DL
RBC # BLD AUTO: 4.15 M/UL
SODIUM SERPL-SCNC: 138 MMOL/L
WBC # BLD AUTO: 7.67 K/UL

## 2017-02-20 PROCEDURE — 25000003 PHARM REV CODE 250: Performed by: HOSPITALIST

## 2017-02-20 PROCEDURE — 80048 BASIC METABOLIC PNL TOTAL CA: CPT

## 2017-02-20 PROCEDURE — 83735 ASSAY OF MAGNESIUM: CPT

## 2017-02-20 PROCEDURE — 99233 SBSQ HOSP IP/OBS HIGH 50: CPT | Mod: ,,, | Performed by: INTERNAL MEDICINE

## 2017-02-20 PROCEDURE — 94761 N-INVAS EAR/PLS OXIMETRY MLT: CPT

## 2017-02-20 PROCEDURE — 85025 COMPLETE CBC W/AUTO DIFF WBC: CPT

## 2017-02-20 PROCEDURE — 84100 ASSAY OF PHOSPHORUS: CPT

## 2017-02-20 PROCEDURE — 36415 COLL VENOUS BLD VENIPUNCTURE: CPT

## 2017-02-20 PROCEDURE — 63600175 PHARM REV CODE 636 W HCPCS: Performed by: HOSPITALIST

## 2017-02-20 PROCEDURE — 80076 HEPATIC FUNCTION PANEL: CPT

## 2017-02-20 PROCEDURE — 11000001 HC ACUTE MED/SURG PRIVATE ROOM

## 2017-02-20 PROCEDURE — 25000003 PHARM REV CODE 250: Performed by: NURSE PRACTITIONER

## 2017-02-20 RX ORDER — SODIUM,POTASSIUM PHOSPHATES 280-250MG
2 POWDER IN PACKET (EA) ORAL
Status: DISCONTINUED | OUTPATIENT
Start: 2017-02-20 | End: 2017-02-21 | Stop reason: HOSPADM

## 2017-02-20 RX ORDER — PROCHLORPERAZINE EDISYLATE 5 MG/ML
10 INJECTION INTRAMUSCULAR; INTRAVENOUS ONCE
Status: COMPLETED | OUTPATIENT
Start: 2017-02-20 | End: 2017-02-20

## 2017-02-20 RX ORDER — NAPROXEN 500 MG/1
500 TABLET ORAL 2 TIMES DAILY PRN
Status: DISCONTINUED | OUTPATIENT
Start: 2017-02-20 | End: 2017-02-21 | Stop reason: HOSPADM

## 2017-02-20 RX ORDER — POTASSIUM CHLORIDE 20 MEQ/1
40 TABLET, EXTENDED RELEASE ORAL ONCE
Status: COMPLETED | OUTPATIENT
Start: 2017-02-20 | End: 2017-02-20

## 2017-02-20 RX ADMIN — POTASSIUM & SODIUM PHOSPHATES POWDER PACK 280-160-250 MG 2 PACKET: 280-160-250 PACK at 08:02

## 2017-02-20 RX ADMIN — MORPHINE SULFATE 2 MG: 2 INJECTION, SOLUTION INTRAMUSCULAR; INTRAVENOUS at 12:02

## 2017-02-20 RX ADMIN — NAPROXEN 500 MG: 500 TABLET ORAL at 07:02

## 2017-02-20 RX ADMIN — POTASSIUM CHLORIDE 40 MEQ: 20 TABLET, EXTENDED RELEASE ORAL at 08:02

## 2017-02-20 RX ADMIN — URSODIOL 250 MG: 250 TABLET, FILM COATED ORAL at 04:02

## 2017-02-20 RX ADMIN — URSODIOL 250 MG: 250 TABLET, FILM COATED ORAL at 08:02

## 2017-02-20 RX ADMIN — PROCHLORPERAZINE EDISYLATE 10 MG: 5 INJECTION INTRAMUSCULAR; INTRAVENOUS at 04:02

## 2017-02-20 RX ADMIN — PIPERACILLIN SODIUM AND TAZOBACTAM SODIUM 4.5 G: 4; .5 INJECTION, POWDER, FOR SOLUTION INTRAVENOUS at 08:02

## 2017-02-20 RX ADMIN — PIPERACILLIN SODIUM AND TAZOBACTAM SODIUM 4.5 G: 4; .5 INJECTION, POWDER, FOR SOLUTION INTRAVENOUS at 12:02

## 2017-02-20 RX ADMIN — PIPERACILLIN SODIUM AND TAZOBACTAM SODIUM 4.5 G: 4; .5 INJECTION, POWDER, FOR SOLUTION INTRAVENOUS at 11:02

## 2017-02-20 RX ADMIN — ACETAMINOPHEN 650 MG: 325 TABLET ORAL at 07:02

## 2017-02-20 RX ADMIN — ONDANSETRON 4 MG: 2 INJECTION INTRAMUSCULAR; INTRAVENOUS at 12:02

## 2017-02-20 RX ADMIN — POTASSIUM & SODIUM PHOSPHATES POWDER PACK 280-160-250 MG 2 PACKET: 280-160-250 PACK at 04:02

## 2017-02-20 RX ADMIN — ACETAMINOPHEN 650 MG: 325 TABLET ORAL at 04:02

## 2017-02-20 RX ADMIN — PIPERACILLIN SODIUM AND TAZOBACTAM SODIUM 4.5 G: 4; .5 INJECTION, POWDER, FOR SOLUTION INTRAVENOUS at 04:02

## 2017-02-20 NOTE — PLAN OF CARE
Headache still a 10/10, still nauseated, medicated with morphine for headache, and zofran for nausea, dry heaves,bringing up mucous,

## 2017-02-20 NOTE — PLAN OF CARE
States her headache is again a 9 states when she gets a headache at home, takes aleve liquid, told will need to check with md, we do not carry this  In the hospital, and i am not sure they would want her to get a nsaid,

## 2017-02-20 NOTE — PLAN OF CARE
PRETTY Field NP notified of headache 10/10 nausea with dry heaves, told given the morphine and zofran, has not eaten, will look in on her

## 2017-02-20 NOTE — PLAN OF CARE
Problem: Patient Care Overview  Goal: Plan of Care Review  Outcome: Ongoing (interventions implemented as appropriate)  Pt rested well. No acute distress tonight. Low grade temp max 99.5. Tolerating cl diet and voiding well.

## 2017-02-20 NOTE — CHAPLAIN
Patient was awake and visitor was sitting next to bed.  When I introduced myself as the  and offered support and prayer patient said that she is Mormon and would like prayer.  She was tearful during prayer and said her head hurt, but did not want to speak any more at the time.  I will follow-up with her.

## 2017-02-20 NOTE — PROGRESS NOTES
[facility]  Progress Note    Patient Name: Nel Ayala  MRN: [id number]  Patient Class: IP- Inpatient   Admission Date: 2/19/2017  Length of Stay: 1 days  Attending Physician: Johnathan Sutton MD  Primary Care Provider: Primary Doctor No        Subjective:     Principal Problem:Cholangitis    HPI:  Nel Ayala is a 36 year old  women, mother of 3 children with a history of cholecystectomy, choledocholithiasis with temporary stent placement and no other medical history and takes no medications.  She takes depot-provera injections for birth control.  She has admitted in January 2016 for choledocholithiasis, s/p cholecystectomy 1/18/16 with J-P drain and subsequent elevated LFTs, underwent ERCP 1/19/16 with sphincterotomy and temporary plastic stent placement. She has been unable to follow up with  secondary to insurance problems.  She presented to [facility] ER with abdominal pain, nausea for 2 days and was found to have jaundice. Her bilirubin was 6, , , WBC 13K, Creatinine 0.7. Her vitals were 106/57, , Temp 99F. A CT scan was performed and it as reported that the CBD stent has migrated into the wrong position. She was given 2 liters of Normal Saline. She was transferred to [facility], admitted to [facility] for cholangitis with sepsis and elevated liver enzymes.    Upon arrival, her temperature was 102.3 F and HR is 130's (Sinus Tachycardia) and complaining of 10/10 abdominal pain.  She is given a 1 liter normal saline bolus, followed by 125 ml.hr and started on Zosyn and treated with zofran and morphine for pain and nausea.  GI consult today. Repeat labs are pending.       Hospital Course:  Was taken emergently for ERCP by Dr. Portillo. Occluded stent, sludge, pus and stones were removed via sphincterotomy. She was transferred to ICU for Sepsis monitoring. Tolerating Clear liquid diet and Usodial 250 mg  TID.                                 Interval History:   Abdominal pain resolved. No nausea. Tolerating clear liquid diet. Will advance as tolerated.  She has a headache because she does not have her glasses. Will likely discharge with Ciprofloxacin for the remainder of a 10 day course.     Review of Systems   Constitutional: Negative for chills, diaphoresis and fever.   Respiratory: Negative for cough, shortness of breath and wheezing.    Cardiovascular: Negative for chest pain and palpitations.   Gastrointestinal: Negative for abdominal pain, nausea and vomiting.   Genitourinary: Negative for dysuria.   Neurological: Positive for headaches.     Objective:     Vital Signs (Most Recent):  Temp: 97.9 °F (36.6 °C) (02/20/17 0736)  Pulse: 93 (02/20/17 1000)  Resp: (!) 26 (02/20/17 1000)  BP: 110/76 (02/20/17 1000)  SpO2: 99 % (02/20/17 1000) Vital Signs (24h Range):  Temp:  [97.9 °F (36.6 °C)-101.1 °F (38.4 °C)] 97.9 °F (36.6 °C)  Pulse:  [] 93  Resp:  [0-54] 26  SpO2:  [93 %-99 %] 99 %  BP: ()/(51-76) 110/76     Weight: 106.2 kg (234 lb 2.1 oz)  Body mass index is 42.82 kg/(m^2).    Intake/Output Summary (Last 24 hours) at 02/20/17 1125  Last data filed at 02/20/17 0909   Gross per 24 hour   Intake             4375 ml   Output             3700 ml   Net              675 ml      Physical Exam   Constitutional: She is oriented to person, place, and time. She appears well-developed and well-nourished. No distress.   HENT:   Head: Normocephalic and atraumatic.   Mouth/Throat: Oropharynx is clear and moist. No oropharyngeal exudate.   Eyes: Conjunctivae are normal. Pupils are equal, round, and reactive to light. No scleral icterus.   Neck: Neck supple.   Cardiovascular: Normal rate, regular rhythm, normal heart sounds and intact distal pulses.  Exam reveals no gallop and no friction rub.    No murmur heard.  Pulmonary/Chest: Effort normal and breath sounds normal. No respiratory distress. She has no wheezes. She  has no rales.   Abdominal: Soft. Bowel sounds are normal. She exhibits no distension. There is no tenderness. There is no rebound and no guarding.   Musculoskeletal: She exhibits no edema, tenderness or deformity.   Neurological: She is alert and oriented to person, place, and time. She exhibits normal muscle tone.   Skin: Skin is warm and dry. No rash noted. She is not diaphoretic.   Psychiatric: She has a normal mood and affect. Her behavior is normal.   Nursing note and vitals reviewed.      Significant Labs:   CBC:   Recent Labs  Lab 02/19/17 0756 02/20/17 0339   WBC 11.90 7.67   HGB 12.8 11.4*   HCT 37.9 34.4*   * 122*     CMP:   Recent Labs  Lab 02/19/17 0756 02/20/17 0339   * 138   K 3.4* 3.2*    109   CO2 19* 19*   * 102   BUN 4* 4*   CREATININE 0.7 0.6   CALCIUM 8.8 8.4*   PROT 7.2 6.3   ALBUMIN 3.3* 2.6*   BILITOT 5.8* 4.9*   ALKPHOS 96 84   AST 99* 37   * 160*   ANIONGAP 12 10   EGFRNONAA >60 >60       Significant Imaging:   Imaging Results         FL ERCP Biliary Only (Final result) Result time:  02/20/17 08:11:04    Final result by Ramon Watson MD (02/20/17 08:11:04)    Impression:      As above.       Electronically signed by: RAMON WATSON MD  Date:     02/20/17  Time:    08:11     Narrative:    Fluoroscopic unit was utilized in surgery for ERCP procedural localization purposes.  Fluoroscopic time was not provided.  See procedure report for full details.                Assessment/Plan:      S/P cholecystectomy 1/18/16  , [facility]      Sepsis  Cholangitis, Choledochlithiasis,  Abnormal Liver Enzymes  S/P Temporary Stent placment on 1/20/16 (Dr. Portillo) Patient unable to follow for ERCP due to financial concerns.  ABD CT shows stent migration with mild intra-and extra hepatic biliary ductal dilation.  Her bilirubin was 6, , , WBC 13K, Creatinine 0.7.  She was tachycardic with a temperature of 102.3, and 10/10 pain upon admits.  She  is getting an additional liter of Normal Saline, then infusion at 125 ml/hr. IV Zosyn.    Emergent ERCP by Dr. Portillo. Occluded stent, sludge, pus and stones were removed via sphincterotomy. She was transferred to ICU for Sepsis monitoring. Liver enzymes improving.    Tolerated Clear liquid diet, will advance as tolerated.  Started Usodial 250 mg TID with meals. Will discharge with 7 days of oral Ciprofloxacin.        VTE Risk Mitigation         Ordered     Medium Risk of VTE  Once      02/19/17 0717     Place sequential compression device  Until discontinued      02/19/17 0717     Place DARYA hose  Until discontinued      02/19/17 0717          Lissy Garcia NP  Department of Hospital Medicine   [facility]

## 2017-02-20 NOTE — PLAN OF CARE
Problem: Patient Care Overview  Goal: Plan of Care Review  Outcome: Ongoing (interventions implemented as appropriate)  ABCDE bundle cam negative,  Now on room air, pt to increase activity as eb post ercp, having no pain past the procedure, stent, removed, sludge, pus removed, also stones, to start on clear liquids as eb and urosotolol

## 2017-02-20 NOTE — SUBJECTIVE & OBJECTIVE
Interval History:   Abdominal pain resolved. No nausea. Tolerating clear liquid diet. Will advance as tolerated.  She has a headache because she does not have her glasses. Will likely discharge with Ciprofloxacin for the remainder of a 10 day course.     Review of Systems   Constitutional: Negative for chills, diaphoresis and fever.   Respiratory: Negative for cough, shortness of breath and wheezing.    Cardiovascular: Negative for chest pain and palpitations.   Gastrointestinal: Negative for abdominal pain, nausea and vomiting.   Genitourinary: Negative for dysuria.   Neurological: Positive for headaches.     Objective:     Vital Signs (Most Recent):  Temp: 97.9 °F (36.6 °C) (02/20/17 0736)  Pulse: 93 (02/20/17 1000)  Resp: (!) 26 (02/20/17 1000)  BP: 110/76 (02/20/17 1000)  SpO2: 99 % (02/20/17 1000) Vital Signs (24h Range):  Temp:  [97.9 °F (36.6 °C)-101.1 °F (38.4 °C)] 97.9 °F (36.6 °C)  Pulse:  [] 93  Resp:  [0-54] 26  SpO2:  [93 %-99 %] 99 %  BP: ()/(51-76) 110/76     Weight: 106.2 kg (234 lb 2.1 oz)  Body mass index is 42.82 kg/(m^2).    Intake/Output Summary (Last 24 hours) at 02/20/17 1125  Last data filed at 02/20/17 0909   Gross per 24 hour   Intake             4375 ml   Output             3700 ml   Net              675 ml      Physical Exam   Constitutional: She is oriented to person, place, and time. She appears well-developed and well-nourished. No distress.   HENT:   Head: Normocephalic and atraumatic.   Mouth/Throat: Oropharynx is clear and moist. No oropharyngeal exudate.   Eyes: Conjunctivae are normal. Pupils are equal, round, and reactive to light. No scleral icterus.   Neck: Neck supple.   Cardiovascular: Normal rate, regular rhythm, normal heart sounds and intact distal pulses.  Exam reveals no gallop and no friction rub.    No murmur heard.  Pulmonary/Chest: Effort normal and breath sounds normal. No respiratory distress. She has no wheezes. She has no rales.   Abdominal: Soft.  Bowel sounds are normal. She exhibits no distension. There is no tenderness. There is no rebound and no guarding.   Musculoskeletal: She exhibits no edema, tenderness or deformity.   Neurological: She is alert and oriented to person, place, and time. She exhibits normal muscle tone.   Skin: Skin is warm and dry. No rash noted. She is not diaphoretic.   Psychiatric: She has a normal mood and affect. Her behavior is normal.   Nursing note and vitals reviewed.      Significant Labs:   CBC:   Recent Labs  Lab 02/19/17 0756 02/20/17 0339   WBC 11.90 7.67   HGB 12.8 11.4*   HCT 37.9 34.4*   * 122*     CMP:   Recent Labs  Lab 02/19/17 0756 02/20/17 0339   * 138   K 3.4* 3.2*    109   CO2 19* 19*   * 102   BUN 4* 4*   CREATININE 0.7 0.6   CALCIUM 8.8 8.4*   PROT 7.2 6.3   ALBUMIN 3.3* 2.6*   BILITOT 5.8* 4.9*   ALKPHOS 96 84   AST 99* 37   * 160*   ANIONGAP 12 10   EGFRNONAA >60 >60       Significant Imaging:   Imaging Results         FL ERCP Biliary Only (Final result) Result time:  02/20/17 08:11:04    Final result by Ramon Watson MD (02/20/17 08:11:04)    Impression:      As above.       Electronically signed by: RAMON WATSON MD  Date:     02/20/17  Time:    08:11     Narrative:    Fluoroscopic unit was utilized in surgery for ERCP procedural localization purposes.  Fluoroscopic time was not provided.  See procedure report for full details.

## 2017-02-20 NOTE — PLAN OF CARE
On return from xray noted that Dr Sutton was by and told of continued headache, ordered compazine and was given by nurse Jorge RN, pt to be transferred to 456

## 2017-02-20 NOTE — PLAN OF CARE
Pt co of headache 10/10 never went away with tylenol only dulled it, also co of pain  Upper achy pains under breast and laterally, hurts with movement, coughing, now co of nausea also, up to commode and passed large soft bm, and urine, headache top of head and back of head , bp 123/78 pulse 90 st,

## 2017-02-20 NOTE — PHYSICIAN QUERY
"PT Name: Nel Ayala  MR #: [id number]     Physician Query Form - Documentation Clarification    Reviewer  Ext [phone]            Meeta    This form is a permanent document in the medical record.     Query Date: February 20, 2017  By submitting this query, we are merely seeking further clarification of documentation to reflect the severity of illness of your patient. Please utilize your independent clinical judgment when addressing the question(s) below.    (The Medical record reflects the following:)      Supporting Clinical Findings Location in Medical Record       BMI= 42    Height= 5'2"       Anthropometrics                                                                                      Doctor, Please specify diagnosis or diagnoses associated with above clinical findings.    Physician Use Only    [ x ] Morbid Obesity  [  ] Overweight  [  ] Clinically unable to determine  [  ] Other explanations with details_______________________________________                                                                                                                                     "

## 2017-02-20 NOTE — PROGRESS NOTES
"[facility]    CC: abdominal pain    HPI 36 y.o. female with past medical history of choledocholithiasis s/p cholecystectomy and biliary stent placement due to an inflammatory biliary stricture 1/20/17 who presented with acute onset severe right upper quadrant pain with associated nausea, fever and elevated total bilirubin consistent with ascending cholangitis secondary to an obstructed biliary stent.  ERCP performed 2/19/17 with stent removal, sphincterotomy performed and bile duct cleared of sludge, pus and stones.    This morning the patient reports her abdominal pain and nausea have resolved.  She is tolerating clears.  She reports she has a headache though this morning.    Chart reviewed.    Collateral information obtained.      Past Medical History   Diagnosis Date    Calculus of bile duct with obstruction and without cholangitis or cholecystitis 1/20/2016    Choledocholithiasis     Cholelithiasis          Review of Systems  General ROS: negative for chills, or fever today.  Negative for weight loss  Cardiovascular ROS: no chest pain or dyspnea on exertion  Gastrointestinal ROS: no abdominal pain, change in bowel habits, or black/ bloody stools    Physical Examination  Visit Vitals    BP (!) 90/52    Pulse 99    Temp 97.9 °F (36.6 °C) (Oral)    Resp 17    Ht 5' 2" (1.575 m)    Wt 106.2 kg (234 lb 2.1 oz)    LMP 01/16/2017 (Exact Date)    SpO2 98%    Breastfeeding No    BMI 42.82 kg/m2     General appearance: alert, cooperative, no distress  HENT: Normocephalic, atraumatic, neck symmetrical, no nasal discharge   Lungs: clear to auscultation bilaterally, no dullness to percussion bilaterally  Heart: regular rate and rhythm without rub; no displacement of the PMI   Abdomen: soft, non-tender; bowel sounds normoactive; no organomegaly  Extremities: extremities symmetric; no clubbing, cyanosis, or edema  Neurologic: Alert and oriented X 3, normal strength, normal coordination and intact " sensation to light touch    Labs:  WBC 7.67  TB 4.9      Imaging:ERCP Fluoro images- contrast injected into the biliary tree with small stones seen    Assessment: The patient is a 35 yo female with recent choledocholithiasis s/p cholecystectomy with biliary duct stent placed who presented with ascending cholangitis.  She is s/p ERCP with stent removal, sphincterotomy and removal of stones, sludge and pus from the biliary tree.  She is currently afebrile without leukocytosis.  Her abdominal pain and nausea have resolved.  Total bilirubin is trending down.    Plan:  Advance diet as tolerated    Monitor hepatic function panel while inpatient    Continue IV zosyn while inpatient, the patient should receive a total of 10 days of antibiotic therapy for acute cholangitis, recommend transitioning to PO cipro and flagyl at discharge to complete the 10 day course.    Letty Giles  [facility] PGY V

## 2017-02-21 ENCOUNTER — TELEPHONE (OUTPATIENT)
Dept: GASTROENTEROLOGY | Facility: CLINIC | Age: 37
End: 2017-02-21

## 2017-02-21 VITALS
SYSTOLIC BLOOD PRESSURE: 109 MMHG | BODY MASS INDEX: 43.08 KG/M2 | RESPIRATION RATE: 18 BRPM | HEART RATE: 69 BPM | WEIGHT: 234.13 LBS | TEMPERATURE: 98 F | DIASTOLIC BLOOD PRESSURE: 74 MMHG | OXYGEN SATURATION: 99 % | HEIGHT: 62 IN

## 2017-02-21 PROBLEM — A41.9 SEPSIS: Status: RESOLVED | Noted: 2017-02-19 | Resolved: 2017-02-21

## 2017-02-21 LAB
ALBUMIN SERPL BCP-MCNC: 3 G/DL
ALP SERPL-CCNC: 86 U/L
ALT SERPL W/O P-5'-P-CCNC: 117 U/L
ANION GAP SERPL CALC-SCNC: 10 MMOL/L
AST SERPL-CCNC: 31 U/L
BILIRUB SERPL-MCNC: 3 MG/DL
BUN SERPL-MCNC: 6 MG/DL
CALCIUM SERPL-MCNC: 9.2 MG/DL
CHLORIDE SERPL-SCNC: 107 MMOL/L
CO2 SERPL-SCNC: 21 MMOL/L
CREAT SERPL-MCNC: 0.6 MG/DL
EST. GFR  (AFRICAN AMERICAN): >60 ML/MIN/1.73 M^2
EST. GFR  (NON AFRICAN AMERICAN): >60 ML/MIN/1.73 M^2
GLUCOSE SERPL-MCNC: 95 MG/DL
MAGNESIUM SERPL-MCNC: 2 MG/DL
PHOSPHATE SERPL-MCNC: 2.3 MG/DL
POTASSIUM SERPL-SCNC: 3.5 MMOL/L
PROT SERPL-MCNC: 7.4 G/DL
SODIUM SERPL-SCNC: 138 MMOL/L

## 2017-02-21 PROCEDURE — 83735 ASSAY OF MAGNESIUM: CPT

## 2017-02-21 PROCEDURE — 36415 COLL VENOUS BLD VENIPUNCTURE: CPT

## 2017-02-21 PROCEDURE — 25000003 PHARM REV CODE 250: Performed by: NURSE PRACTITIONER

## 2017-02-21 PROCEDURE — 25000003 PHARM REV CODE 250: Performed by: HOSPITALIST

## 2017-02-21 PROCEDURE — 94761 N-INVAS EAR/PLS OXIMETRY MLT: CPT

## 2017-02-21 PROCEDURE — 63600175 PHARM REV CODE 636 W HCPCS: Performed by: HOSPITALIST

## 2017-02-21 PROCEDURE — 84100 ASSAY OF PHOSPHORUS: CPT

## 2017-02-21 PROCEDURE — 80053 COMPREHEN METABOLIC PANEL: CPT

## 2017-02-21 RX ORDER — URSODIOL 250 MG/1
250 TABLET, FILM COATED ORAL
Qty: 90 TABLET | Refills: 1 | Status: SHIPPED | OUTPATIENT
Start: 2017-02-21 | End: 2017-02-21

## 2017-02-21 RX ORDER — METRONIDAZOLE 500 MG/1
500 TABLET ORAL 3 TIMES DAILY
Qty: 21 TABLET | Refills: 0 | Status: SHIPPED | OUTPATIENT
Start: 2017-02-21 | End: 2017-02-21

## 2017-02-21 RX ORDER — CIPROFLOXACIN 500 MG/1
500 TABLET ORAL 2 TIMES DAILY
Qty: 14 TABLET | Refills: 0 | Status: SHIPPED | OUTPATIENT
Start: 2017-02-21 | End: 2017-02-21

## 2017-02-21 RX ORDER — URSODIOL 250 MG/1
250 TABLET, FILM COATED ORAL
Qty: 90 TABLET | Refills: 1 | Status: SHIPPED | OUTPATIENT
Start: 2017-02-21 | End: 2020-12-16

## 2017-02-21 RX ORDER — NAPROXEN 500 MG/1
500 TABLET ORAL 2 TIMES DAILY PRN
Qty: 10 TABLET | Refills: 0 | OUTPATIENT
Start: 2017-02-21 | End: 2020-10-06

## 2017-02-21 RX ORDER — ONDANSETRON 4 MG/1
8 TABLET, ORALLY DISINTEGRATING ORAL EVERY 12 HOURS PRN
Start: 2017-02-21 | End: 2020-12-16 | Stop reason: SDUPTHER

## 2017-02-21 RX ORDER — NAPROXEN 500 MG/1
500 TABLET ORAL 2 TIMES DAILY PRN
Qty: 10 TABLET | Refills: 0 | Status: SHIPPED | OUTPATIENT
Start: 2017-02-21 | End: 2017-02-21

## 2017-02-21 RX ORDER — METRONIDAZOLE 500 MG/1
500 TABLET ORAL 3 TIMES DAILY
Qty: 21 TABLET | Refills: 0 | Status: SHIPPED | OUTPATIENT
Start: 2017-02-21 | End: 2017-02-28

## 2017-02-21 RX ORDER — CIPROFLOXACIN 500 MG/1
500 TABLET ORAL 2 TIMES DAILY
Qty: 14 TABLET | Refills: 0 | Status: SHIPPED | OUTPATIENT
Start: 2017-02-21 | End: 2017-02-28

## 2017-02-21 RX ADMIN — POTASSIUM & SODIUM PHOSPHATES POWDER PACK 280-160-250 MG 2 PACKET: 280-160-250 PACK at 10:02

## 2017-02-21 RX ADMIN — PIPERACILLIN SODIUM AND TAZOBACTAM SODIUM 4.5 G: 4; .5 INJECTION, POWDER, FOR SOLUTION INTRAVENOUS at 08:02

## 2017-02-21 RX ADMIN — URSODIOL 250 MG: 250 TABLET, FILM COATED ORAL at 10:02

## 2017-02-21 NOTE — ASSESSMENT & PLAN NOTE
Cholangitis, Choledochlithiasis,  Abnormal Liver Enzymes  Sepsis resolved. S/P Temporary Stent placment on 1/20/16 (Dr. Portillo) Patient unable to follow for ERCP due to financial concerns.  ABD CT showed stent migration with mild intra-and extra hepatic biliary ductal dilation.  Had emergent ERCP by Dr. Portillo on 2/20.  T bili now 3, down from 6 on admission. , down from 281 on admission.  Received IV Zosyn, day #3.  Tolerating diet. Ambulating without difficulty. Continue Ursodial 250 mg TID with meals. Will discharge with 7 days of oral Ciprofloxacin and Flagyl.

## 2017-02-21 NOTE — SUBJECTIVE & OBJECTIVE
Interval History: Pt c/o mild left sided headache. Denies fever, neck stiffness, nausea or vomiting. Denies CP, SOB.  States wants printed prescriptions to have filled in her home town of [place].      Review of Systems   Constitutional: Negative for chills, diaphoresis and fever.   Respiratory: Negative for cough and shortness of breath.    Cardiovascular: Negative for chest pain and palpitations.   Gastrointestinal: Negative for abdominal pain, nausea and vomiting.   Genitourinary: Negative for dysuria.   Neurological: Positive for headaches.        Improved HA     Objective:     Vital Signs (Most Recent):  Temp: 98.4 °F (36.9 °C) (02/21/17 0732)  Pulse: 77 (02/21/17 0732)  Resp: 18 (02/21/17 0732)  BP: 111/76 (02/21/17 0732)  SpO2: 98 % (02/20/17 1546) Vital Signs (24h Range):  Temp:  [97.2 °F (36.2 °C)-100.5 °F (38.1 °C)] 98.4 °F (36.9 °C)  Pulse:  [] 77  Resp:  [15-41] 18  SpO2:  [97 %-100 %] 98 %  BP: (103-123)/(64-84) 111/76     Weight: 106.2 kg (234 lb 2.1 oz)  Body mass index is 42.82 kg/(m^2).    Intake/Output Summary (Last 24 hours) at 02/21/17 1119  Last data filed at 02/21/17 0600   Gross per 24 hour   Intake              640 ml   Output             2800 ml   Net            -2160 ml      Physical Exam   Constitutional: She is oriented to person, place, and time. She appears well-developed. No distress.   obese   HENT:   Mouth/Throat: Oropharynx is clear and moist.   Cardiovascular: Normal rate and regular rhythm.    No murmur heard.  Pulmonary/Chest: Effort normal and breath sounds normal. No respiratory distress.   Abdominal: Soft. Bowel sounds are normal. She exhibits no distension. There is no tenderness.   Musculoskeletal: Normal range of motion.   Neurological: She is alert and oriented to person, place, and time.   Psychiatric: She has a normal mood and affect. Her behavior is normal. Judgment and thought content normal.   Nursing note and vitals reviewed.      Significant Labs:   CMP:    Recent Labs  Lab 02/20/17  0339 02/21/17  0953    138   K 3.2* 3.5    107   CO2 19* 21*    95   BUN 4* 6   CREATININE 0.6 0.6   CALCIUM 8.4* 9.2   PROT 6.3 7.4   ALBUMIN 2.6* 3.0*   BILITOT 4.9* 3.0*   ALKPHOS 84 86   AST 37 31   * 117*   ANIONGAP 10 10   EGFRNONAA >60 >60       Significant Imaging: no new

## 2017-02-21 NOTE — DISCHARGE INSTRUCTIONS
Continue low-fat, bland diet for the next 5 days.  Advance as tolerated.      CIPROFLOXACIN TABLETS (ENGLISH) View Edit Remove  NAPROXEN SODIUM ORAL TABLET, EXTENDED-RELEASE (ENGLISH) View Edit Remove  URSODEOXYCHOLIC ACID, URSODIOL CAPSULES OR TABLETS (ENGLISH) View Edit Remove  METRONIDAZOLE TABLETS OR CAPSULES (ENGLISH) View Edit Remove  ABDOMINAL PAIN, ADULT (ENGLISH) View Edit Remove

## 2017-02-21 NOTE — ASSESSMENT & PLAN NOTE
Cholangitis, Choledochlithiasis,  Abnormal Liver Enzymes  Sepsis resolved. S/P Temporary Stent placment on 1/20/16 (Dr. Portillo) Patient unable to follow for ERCP due to financial concerns.  ABD CT showed stent migration with mild intra-and extra hepatic biliary ductal dilation.  Had emergent ERCP by Dr. Portillo on 2/20.  T bili now 3, down from 6 on admission. , down from 281 on admission.  Continue IV Zosyn, day #3.  Tolerating diet. Ambulating without difficulty. Continue Ursodial 250 mg TID with meals. Will discharge with 7 days of oral Ciprofloxacin and Flagyl.

## 2017-02-21 NOTE — PLAN OF CARE
Follow-up With  Details  Why  Contact Info   David Portillo MD    This clinic will contact you with your follow up date and time.  [address]  [address]  [place] [address]  [phone]     Inboxed message to Bandar for ERCP f/u.  Explained to patient that this MD will not continue to see her repeatedly and asked if she would let me make a f/u at one of the are clinics.  Patient refused at this time. Patient has family member with her who will [place] her [place] at [place].     02/21/17 1019   Final Note   Assessment Type Final Discharge Note   Discharge Disposition Home   Discharge planning education complete? Yes   What phone number can be called within the next 1-3 days to see how you are doing after discharge? [phone]   Hospital Follow Up  Appt(s) scheduled? (message sent to Shamar)   Discharge plans and expectations educations in teach back method with documentation complete? Yes   Offered [facility] -- Bedside Delivery? n/a   Discharge/Hospital Encounter Summary to (non-[facility]) PCP n/a   Referral to Outpatient Case Management complete? No   Referral to / orders for Home Health Complete? No   30 day supply of medicines given at discharge, if documented non-compliance / non-adherence? No   Any social issues identified prior to discharge? No   Did you assess the readiness or willingness of the family or caregiver to support self management of care? Yes   Right Care Referral Info   Post Acute Recommendation No Care   Dale Salinas RN  Transitional Navigator/Case Management  [phone]

## 2017-02-21 NOTE — DISCHARGE SUMMARY
[facility]  Discharge Summary      Patient Name: Nel Ayala  MRN: [id number]  Admission Date: 2/19/2017  Hospital Length of Stay: 2 days  Discharge Date and Time:  02/21/2017 11:47 AM  Attending Physician: Dae Escobedo MD   Discharging Provider: Sandie Payne PA-C  Primary Care Provider: Primary Doctor Missy      HPI:   Nel Ayala is a 36 year old  women, mother of 3 children with a history of cholecystectomy and choledocholithiasis with temporary stent placement 1/2016.  She takes depot-provera injections for birth control.  She has admitted in January 2016 for choledocholithiasis, s/p cholecystectomy 1/18/16 with J-P drain and subsequent elevated LFTs. She underwent ERCP 1/19/16 with sphincterotomy and temporary plastic stent placement. She was unable to follow up with  secondary to insurance problems.  She presented to [facility] ER with abdominal pain, nausea for 2 days and was found to have jaundice. Her bilirubin was 6, , , WBC 13K, Creatinine 0.7. Her vitals were 106/57, , Temp 99F. A CT scan was performed and it as reported that the CBD stent has migrated into the wrong position. She was given 2 liters of Normal Saline. She was transferred to [facility], admitted to [facility] for cholangitis with sepsis and elevated liver enzymes.    Upon arrival, her temperature was 102.3 F and HR is 130's (Sinus Tachycardia) and complaining of 10/10 abdominal pain.  She was given 1 liter normal saline bolus, followed by 125 ml.hr and started on Zosyn and treated with zofran and morphine for pain and nausea.  GI consulted.        Procedure(s) (LRB):  ERCP (N/A)      Indwelling Lines/Drains at time of discharge:   Lines/Drains/Airways          No matching active lines, drains, or airways        Hospital Course:   Was taken emergently for ERCP by Dr. Portillo on 2/19/2017: Occluded stent, sludge, pus and stones  were removed via sphincterotomy. She was transferred to ICU for Sepsis monitoring. Tolerating Clear liquid diet and Ursodial 250 mg TID. Stepped down to floor on 2/20/2017. Now tolerating full diet and ambulating without difficulty.  She will continue on PO cipro and flagyl for another 7 days.                                   Significant Diagnostic Studies: Labs:   CMP   Recent Labs  Lab 02/20/17  0339 02/21/17  0953    138   K 3.2* 3.5    107   CO2 19* 21*    95   BUN 4* 6   CREATININE 0.6 0.6   CALCIUM 8.4* 9.2   PROT 6.3 7.4   ALBUMIN 2.6* 3.0*   BILITOT 4.9* 3.0*   ALKPHOS 84 86   AST 37 31   * 117*   ANIONGAP 10 10   ESTGFRAFRICA >60 >60   EGFRNONAA >60 >60    and CBC   Recent Labs  Lab 02/20/17  0339   WBC 7.67   HGB 11.4*   HCT 34.4*   *     Imaging Results         FL ERCP Biliary Only (Final result) Result time:  02/20/17 08:11:04    Final result by Ramon Watson MD (02/20/17 08:11:04)    Impression:      As above.       Electronically signed by: RAMON WATSON MD  Date:     02/20/17  Time:    08:11     Narrative:    Fluoroscopic unit was utilized in surgery for ERCP procedural localization purposes.  Fluoroscopic time was not provided.  See procedure report for full details.            Microbiology Results (last 7 days)     Procedure Component Value Units Date/Time    Blood culture (site 2) [[id number]] Collected:  02/19/17 0756    Order Status:  Completed Specimen:  Blood Updated:  02/20/17 1612     Blood Culture, Routine No Growth to date     Blood Culture, Routine No Growth to date    Narrative:       Site # 2, aerobic only    Blood culture (site 1) [[id number]] Collected:  02/19/17 0756    Order Status:  Completed Specimen:  Blood Updated:  02/20/17 1212     Blood Culture, Routine No Growth to date     Blood Culture, Routine No Growth to date    Narrative:       Site # 1, aerobic and anaerobic (central line, if patient has  one)            Pending Diagnostic Studies:      None        Final Active Diagnoses:    Diagnosis Date Noted POA    PRINCIPAL PROBLEM:  Calculus of bile duct with acute cholangitis with obstruction [K80.33] 02/19/2017 Yes    S/P cholecystectomy 1/18/16 [Z90.49] 01/19/2016 Not Applicable    Abnormal liver enzymes [R74.8] 02/19/2017 Yes    Choledocholithiasis with obstruction [K80.51] 02/19/2017 Yes      Problems Resolved During this Admission:    Diagnosis Date Noted Date Resolved POA    Sepsis [A41.9] 02/19/2017 02/21/2017 Yes      * Calculus of bile duct with acute cholangitis with obstruction  Cholangitis, Choledochlithiasis,  Abnormal Liver Enzymes  Sepsis resolved. S/P Temporary Stent placment on 1/20/16 (Dr. Portillo) Patient unable to follow for ERCP due to financial concerns.  ABD CT showed stent migration with mild intra-and extra hepatic biliary ductal dilation.  Had emergent ERCP by Dr. Portillo on 2/20.  T bili now 3, down from 6 on admission. , down from 281 on admission.  Received IV Zosyn, day #3.  Tolerating diet. Ambulating without difficulty. Continue Ursodial 250 mg TID with meals. Will discharge with 7 days of oral Ciprofloxacin and Flagyl.       S/P cholecystectomy 1/18/16  [facility], 1/2016.          Discharged Condition: stable    Disposition: Home or Self Care    Follow Up:  Follow-up Information     Follow up with David [place] MD.    Specialty:  Gastroenterology    Why:  This clinic will contact you with your follow up date and time.    Contact information:    [address]  [address] [address] [phone]          Patient Instructions:     Diet general     Activity as tolerated     Call MD for:  temperature >100.4     Call MD for:  persistent nausea and vomiting or diarrhea     Call MD for:  severe uncontrolled pain       Medications:  Reconciled Home Medications:   Current Discharge Medication List      START taking these medications    Details   ciprofloxacin HCl (CIPRO) 500 MG tablet Take 1  tablet (500 mg total) by mouth 2 (two) times daily.  Qty: 14 tablet, Refills: 0      metronidazole (FLAGYL) 500 MG tablet Take 1 tablet (500 mg total) by mouth 3 (three) times daily.  Qty: 21 tablet, Refills: 0      naproxen (NAPROSYN) 500 MG tablet Take 1 tablet (500 mg total) by mouth 2 (two) times daily as needed (headache).  Qty: 10 tablet, Refills: 0      ursodiol (ACTIGALL) 250 mg Tab Take 1 tablet (250 mg total) by mouth 3 (three) times daily with meals.  Qty: 90 tablet, Refills: 1         CONTINUE these medications which have CHANGED    Details   ondansetron (ZOFRAN-ODT) 4 MG TbDL Take 2 tablets (8 mg total) by mouth every 12 (twelve) hours as needed (nausea).             Sandie Payne PA-C  Department of Hospital Medicine  [facility]  Pager: [phone]    Attending: Dae Escobedo MD

## 2017-02-21 NOTE — PLAN OF CARE
Problem: Patient Care Overview  Goal: Plan of Care Review  Outcome: Ongoing (interventions implemented as appropriate)  Plan of care discussed with patient. Safety measures maintained. Sinus rhythm on tele. No complaints of abdominal or back pain. Afebrile. C/o headache, treated with prn naproxen. IV antiobiotics continued.

## 2017-02-21 NOTE — PLAN OF CARE
Problem: Patient Care Overview  Goal: Plan of Care Review  Outcome: Ongoing (interventions implemented as appropriate)  Pt on RA with sats of 99%. Will continue to monitor.

## 2017-02-21 NOTE — PROGRESS NOTES
[facility] Hospital Medicine  Progress Note    Patient Name: Nel Ayala  MRN: [id number]  Patient Class: IP- Inpatient   Admission Date: 2/19/2017  Length of Stay: 2 days  Attending Physician: Johnathan Sutton MD  Primary Care Provider: Primary Doctor No        Subjective:     Principal Problem:Calculus of bile duct with acute cholangitis with obstruction    HPI:  Nel Ayala is a 36 year old  women, mother of 3 children with a history of cholecystectomy, choledocholithiasis with temporary stent placement and no other medical history and takes no medications.  She takes depot-provera injections for birth control.  She has admitted in January 2016 for choledocholithiasis, s/p cholecystectomy 1/18/16 with J-P drain and subsequent elevated LFTs, underwent ERCP 1/19/16 with sphincterotomy and temporary plastic stent placement. She has been unable to follow up with  secondary to insurance problems.  She presented to [facility] ER with abdominal pain, nausea for 2 days and was found to have jaundice. Her bilirubin was 6, , , WBC 13K, Creatinine 0.7. Her vitals were 106/57, , Temp 99F. A CT scan was performed and it as reported that the CBD stent has migrated into the wrong position. She was given 2 liters of Normal Saline. She was transferred to [facility], admitted to [facility] for cholangitis with sepsis and elevated liver enzymes.    Upon arrival, her temperature was 102.3 F and HR is 130's (Sinus Tachycardia) and complaining of 10/10 abdominal pain.  She is given a 1 liter normal saline bolus, followed by 125 ml.hr and started on Zosyn and treated with zofran and morphine for pain and nausea.  GI consult today. Repeat labs are pending.       Hospital Course:  Was taken emergently for ERCP by Dr. Portillo on 2/19/2017: Occluded stent, sludge, pus and stones were removed via sphincterotomy. She was transferred to ICU for Sepsis  monitoring. Tolerating Clear liquid diet and Ursodial 250 mg TID. Stepped down to floor on 2/20/2017.                                 Interval History: Pt c/o mild left sided headache. Denies fever, neck stiffness, nausea or vomiting. Denies CP, SOB.  States wants printed prescriptions to have filled in her home town of [place].      Review of Systems   Constitutional: Negative for chills, diaphoresis and fever.   Respiratory: Negative for cough and shortness of breath.    Cardiovascular: Negative for chest pain and palpitations.   Gastrointestinal: Negative for abdominal pain, nausea and vomiting.   Genitourinary: Negative for dysuria.   Neurological: Positive for headaches.        Improved HA     Objective:     Vital Signs (Most Recent):  Temp: 98.4 °F (36.9 °C) (02/21/17 0732)  Pulse: 77 (02/21/17 0732)  Resp: 18 (02/21/17 0732)  BP: 111/76 (02/21/17 0732)  SpO2: 98 % (02/20/17 1546) Vital Signs (24h Range):  Temp:  [97.2 °F (36.2 °C)-100.5 °F (38.1 °C)] 98.4 °F (36.9 °C)  Pulse:  [] 77  Resp:  [15-41] 18  SpO2:  [97 %-100 %] 98 %  BP: (103-123)/(64-84) 111/76     Weight: 106.2 kg (234 lb 2.1 oz)  Body mass index is 42.82 kg/(m^2).    Intake/Output Summary (Last 24 hours) at 02/21/17 1119  Last data filed at 02/21/17 0600   Gross per 24 hour   Intake              640 ml   Output             2800 ml   Net            -2160 ml      Physical Exam   Constitutional: She is oriented to person, place, and time. She appears well-developed. No distress.   obese   HENT:   Mouth/Throat: Oropharynx is clear and moist.   Cardiovascular: Normal rate and regular rhythm.    No murmur heard.  Pulmonary/Chest: Effort normal and breath sounds normal. No respiratory distress.   Abdominal: Soft. Bowel sounds are normal. She exhibits no distension. There is no tenderness.   Musculoskeletal: Normal range of motion.   Neurological: She is alert and oriented to person, place, and time.   Psychiatric: She has a normal mood and  affect. Her behavior is normal. Judgment and thought content normal.   Nursing note and vitals reviewed.      Significant Labs:   CMP:   Recent Labs  Lab 02/20/17  0339 02/21/17  0953    138   K 3.2* 3.5    107   CO2 19* 21*    95   BUN 4* 6   CREATININE 0.6 0.6   CALCIUM 8.4* 9.2   PROT 6.3 7.4   ALBUMIN 2.6* 3.0*   BILITOT 4.9* 3.0*   ALKPHOS 84 86   AST 37 31   * 117*   ANIONGAP 10 10   EGFRNONAA >60 >60       Significant Imaging: no new    Assessment/Plan:      * Calculus of bile duct with acute cholangitis with obstruction  Cholangitis, Choledochlithiasis,  Abnormal Liver Enzymes  Sepsis resolved. S/P Temporary Stent placment on 1/20/16 (Dr. Portillo) Patient unable to follow for ERCP due to financial concerns.  ABD CT showed stent migration with mild intra-and extra hepatic biliary ductal dilation.  Had emergent ERCP by Dr. Portillo on 2/20.  T bili now 3, down from 6 on admission. , down from 281 on admission.  Continue IV Zosyn, day #3.  Tolerating diet. Ambulating without difficulty. Continue Ursodial 250 mg TID with meals. Will discharge with 7 days of oral Ciprofloxacin and Flagyl.       VTE Risk Mitigation         Ordered     Medium Risk of VTE  Once      02/19/17 0717     Place sequential compression device  Until discontinued      02/19/17 0717     Place DARYA hose  Until discontinued      02/19/17 0717          Sandie Payne PA-C  Department of Hospital Medicine   [facility]  Pager: [phone]    Attending: Dae Escobedo MD

## 2017-02-21 NOTE — TELEPHONE ENCOUNTER
----- Message from Dale Salinas RN sent at 2/21/2017 10:14 AM CST -----  Please set a follow up for this patient. DC expected today. S/p ERCP on 2/19/17.

## 2017-02-21 NOTE — PROGRESS NOTES
.Pharmacy New Medication Education    Patient accepted medication education.    Pharmacy educated patient on the following medications, using the teach-back method.   Apap  Morphine  Naproxen  Zofran  Zosyn  Phenergan  ursodiol    Learners of pharmacy medication education included:  patient    Patient +/- learner response:  verbalize understanding

## 2017-02-22 ENCOUNTER — PATIENT OUTREACH (OUTPATIENT)
Dept: ADMINISTRATIVE | Facility: CLINIC | Age: 37
End: 2017-02-22
Payer: MEDICAID

## 2017-02-22 NOTE — PROGRESS NOTES
C3 nurse attempted to contact patient. The following occurred:   C3 nurse attempted to contact Nel Ayala for a TCC post hospital discharge follow up call. The patient is unable to conduct the call @ this time. The patient requested a callback.    The patient does not have a scheduled HOSFU appointment within 7-14 days post hospital discharge date 2/21/17.       No pcp listed, unable to route

## 2017-02-24 LAB
BACTERIA BLD CULT: NORMAL
BACTERIA BLD CULT: NORMAL

## 2017-03-24 ENCOUNTER — TELEPHONE (OUTPATIENT)
Dept: GASTROENTEROLOGY | Facility: CLINIC | Age: 37
End: 2017-03-24

## 2017-03-24 NOTE — TELEPHONE ENCOUNTER
----- Message from Barbie Estee sent at 3/23/2017  9:34 AM CDT -----  Contact: self, [phone]  Patient states she was seen at the hospital on 2/22 and was told to follow up with you within 2-3 weeks. Please advise.

## 2017-04-27 ENCOUNTER — OFFICE VISIT (OUTPATIENT)
Dept: GASTROENTEROLOGY | Facility: CLINIC | Age: 37
End: 2017-04-27
Payer: MEDICAID

## 2017-04-27 ENCOUNTER — LAB VISIT (OUTPATIENT)
Dept: LAB | Facility: HOSPITAL | Age: 37
End: 2017-04-27
Attending: INTERNAL MEDICINE
Payer: MEDICAID

## 2017-04-27 VITALS
BODY MASS INDEX: 41.94 KG/M2 | WEIGHT: 227.94 LBS | DIASTOLIC BLOOD PRESSURE: 72 MMHG | SYSTOLIC BLOOD PRESSURE: 110 MMHG | HEIGHT: 62 IN

## 2017-04-27 DIAGNOSIS — R74.8 ABNORMAL LIVER ENZYMES: Primary | ICD-10-CM

## 2017-04-27 DIAGNOSIS — R74.8 ABNORMAL LIVER ENZYMES: ICD-10-CM

## 2017-04-27 LAB
ALBUMIN SERPL BCP-MCNC: 3.7 G/DL
ALP SERPL-CCNC: 60 U/L
ALT SERPL W/O P-5'-P-CCNC: 21 U/L
ANION GAP SERPL CALC-SCNC: 10 MMOL/L
AST SERPL-CCNC: 17 U/L
BILIRUB SERPL-MCNC: 1 MG/DL
BUN SERPL-MCNC: 12 MG/DL
CALCIUM SERPL-MCNC: 9.4 MG/DL
CHLORIDE SERPL-SCNC: 106 MMOL/L
CO2 SERPL-SCNC: 24 MMOL/L
CREAT SERPL-MCNC: 0.8 MG/DL
EST. GFR  (AFRICAN AMERICAN): >60 ML/MIN/1.73 M^2
EST. GFR  (NON AFRICAN AMERICAN): >60 ML/MIN/1.73 M^2
GLUCOSE SERPL-MCNC: 101 MG/DL
POTASSIUM SERPL-SCNC: 3.7 MMOL/L
PROT SERPL-MCNC: 7.8 G/DL
SODIUM SERPL-SCNC: 140 MMOL/L

## 2017-04-27 PROCEDURE — 36415 COLL VENOUS BLD VENIPUNCTURE: CPT

## 2017-04-27 PROCEDURE — 99213 OFFICE O/P EST LOW 20 MIN: CPT | Mod: S$PBB,,, | Performed by: INTERNAL MEDICINE

## 2017-04-27 PROCEDURE — 99212 OFFICE O/P EST SF 10 MIN: CPT | Mod: PBBFAC,PN | Performed by: INTERNAL MEDICINE

## 2017-04-27 PROCEDURE — 99999 PR PBB SHADOW E&M-EST. PATIENT-LVL II: CPT | Mod: PBBFAC,,, | Performed by: INTERNAL MEDICINE

## 2017-04-27 PROCEDURE — 80053 COMPREHEN METABOLIC PANEL: CPT

## 2017-04-27 NOTE — PROGRESS NOTES
Subjective:       Patient ID: Nel Ayala is a 36 y.o. female.    Chief Complaint: Follow-up (Hospital visit)    HPI   Abdominal Pain/ cholangitis / choledocholithiasis .   Patient complains of abdominal pain. The pain is described as dull , and is  in mild in intensity . The patient is experiencing location abdomen epigastric   pain abdomen pain radiation: RUQ . Onset was  weeks ago. Symptoms have been progressive . Aggravating factors: meals.  Alleviating factors:none. Associated symptoms: none. The patient denies fever.      Review of Systems   Constitutional: Negative for chills, fever and unexpected weight change.   HENT: Negative for congestion, hearing loss, trouble swallowing and voice change.    Respiratory: Negative for cough, chest tightness, wheezing and stridor.    Cardiovascular: Negative for chest pain and leg swelling.   Gastrointestinal: Negative for abdominal pain, nausea, rectal pain and vomiting.   Genitourinary: Negative for dysuria and urgency.   Musculoskeletal: Negative for arthralgias, back pain, myalgias and neck pain.   Skin: Negative for pallor and rash.   Neurological: Negative for tremors, seizures and weakness.   Hematological: Negative for adenopathy.   Psychiatric/Behavioral: The patient is not nervous/anxious.        Objective:      Physical Exam   Constitutional: She is oriented to person, place, and time. She appears well-developed. No distress.   HENT:   Head: Normocephalic.   Eyes: EOM are normal. Pupils are equal, round, and reactive to light. Left eye exhibits no discharge. No scleral icterus.   Neck: Normal range of motion.   Cardiovascular: Normal rate and regular rhythm.    Pulmonary/Chest: Effort normal. She has no wheezes. She has no rales.   Abdominal: Soft. Bowel sounds are normal. She exhibits no mass. There is no tenderness.   Musculoskeletal: Normal range of motion. She exhibits no tenderness or deformity.   Neurological: She is alert and oriented to person,  place, and time. Coordination normal.   Skin: No rash noted. No erythema. No pallor.   Psychiatric: Her behavior is normal. Thought content normal.   Nursing note and vitals reviewed.      Assessment:     Choledocholithiasis resolved     Plan:       CMP  F/U 3 months

## 2017-04-30 ENCOUNTER — TELEPHONE (OUTPATIENT)
Dept: GASTROENTEROLOGY | Facility: HOSPITAL | Age: 37
End: 2017-04-30

## 2020-08-05 ENCOUNTER — HOSPITAL ENCOUNTER (EMERGENCY)
Facility: HOSPITAL | Age: 40
Discharge: HOME | End: 2020-08-05
Attending: EMERGENCY MEDICINE
Payer: MEDICAID

## 2020-08-05 VITALS
BODY MASS INDEX: 42.88 KG/M2 | WEIGHT: 233 LBS | HEART RATE: 75 BPM | SYSTOLIC BLOOD PRESSURE: 121 MMHG | TEMPERATURE: 98 F | HEIGHT: 62 IN | OXYGEN SATURATION: 98 % | DIASTOLIC BLOOD PRESSURE: 72 MMHG | RESPIRATION RATE: 16 BRPM

## 2020-08-05 DIAGNOSIS — K29.70 GASTRITIS, PRESENCE OF BLEEDING UNSPECIFIED, UNSPECIFIED CHRONICITY, UNSPECIFIED GASTRITIS TYPE: Primary | ICD-10-CM

## 2020-08-05 LAB
B-HCG UR QL: NEGATIVE
BILIRUB UR QL STRIP: NEGATIVE
CLARITY UR: CLEAR
COLOR UR: YELLOW
GLUCOSE UR QL STRIP: NEGATIVE
HGB UR QL STRIP: NEGATIVE
KETONES UR QL STRIP: NEGATIVE
LEUKOCYTE ESTERASE UR QL STRIP: NEGATIVE
NITRITE UR QL STRIP: NEGATIVE
PH UR STRIP: 5 [PH] (ref 5–8)
PROT UR QL STRIP: NEGATIVE
SP GR UR STRIP: 1.01 (ref 1–1.03)
URN SPEC COLLECT METH UR: NORMAL
UROBILINOGEN UR STRIP-ACNC: NEGATIVE EU/DL

## 2020-08-05 PROCEDURE — 81025 URINE PREGNANCY TEST: CPT

## 2020-08-05 PROCEDURE — 25000003 PHARM REV CODE 250: Performed by: EMERGENCY MEDICINE

## 2020-08-05 PROCEDURE — 81003 URINALYSIS AUTO W/O SCOPE: CPT

## 2020-08-05 PROCEDURE — 99283 EMERGENCY DEPT VISIT LOW MDM: CPT

## 2020-08-05 RX ORDER — LIDOCAINE HYDROCHLORIDE 20 MG/ML
5 SOLUTION OROPHARYNGEAL
Status: COMPLETED | OUTPATIENT
Start: 2020-08-05 | End: 2020-08-05

## 2020-08-05 RX ORDER — ONDANSETRON 4 MG/1
4 TABLET, FILM COATED ORAL EVERY 6 HOURS PRN
Qty: 12 TABLET | Refills: 0 | Status: SHIPPED | OUTPATIENT
Start: 2020-08-05 | End: 2021-08-25

## 2020-08-05 RX ORDER — ONDANSETRON 4 MG/1
4 TABLET, ORALLY DISINTEGRATING ORAL
Status: COMPLETED | OUTPATIENT
Start: 2020-08-05 | End: 2020-08-05

## 2020-08-05 RX ORDER — FAMOTIDINE 20 MG/1
20 TABLET, FILM COATED ORAL 2 TIMES DAILY
Qty: 60 TABLET | Refills: 0 | Status: SHIPPED | OUTPATIENT
Start: 2020-08-05 | End: 2020-09-04

## 2020-08-05 RX ORDER — MAG HYDROX/ALUMINUM HYD/SIMETH 200-200-20
30 SUSPENSION, ORAL (FINAL DOSE FORM) ORAL ONCE
Status: COMPLETED | OUTPATIENT
Start: 2020-08-05 | End: 2020-08-05

## 2020-08-05 RX ADMIN — LIDOCAINE HYDROCHLORIDE 5 ML: 20 SOLUTION ORAL; TOPICAL at 09:08

## 2020-08-05 RX ADMIN — ONDANSETRON 4 MG: 4 TABLET, ORALLY DISINTEGRATING ORAL at 09:08

## 2020-08-05 RX ADMIN — ALUMINUM HYDROXIDE, MAGNESIUM HYDROXIDE, AND SIMETHICONE 30 ML: 200; 200; 20 SUSPENSION ORAL at 09:08

## 2020-08-06 NOTE — ED NOTES
Patient states medications given helped. No more pain or nausea at this time.      Rosanne Bowden RN  08/05/20 [id number]

## 2020-08-06 NOTE — ED PROVIDER NOTES
Encounter Date: 2020       History     Chief Complaint   Patient presents with    Abdominal Pain     right sided for 2 days, n/v, denies diarrhea    Vomiting    Nausea     39-year-old year old female presents with epigastric and right upper quadrant abdominal pain for the past 2-3 days.  Patient states that she has vomited once denies diarrhea denies fever.  She states that she had a cholecystectomy done a few years ago.  States that she took omeprazole at home with moderate relief of symptoms.  Patient states that she wishes to establish with a PCP but does not know who to call this moment.        Review of patient's allergies indicates:  No Known Allergies  Past Medical History:   Diagnosis Date    Calculus of bile duct with obstruction and without cholangitis or cholecystitis 2016    Choledocholithiasis     Cholelithiasis      Past Surgical History:   Procedure Laterality Date     SECTION      CHOLECYSTECTOMY  2016    ENDOSCOPIC RETROGRADE CHOLANGIOPANCREATOGRAPHY W/ SPHINCTEROTOMY AND STONE REMOVAL  2016     Family History   Problem Relation Age of Onset    Gallbladder disease Unknown      Social History     Tobacco Use    Smoking status: Never Smoker    Smokeless tobacco: Never Used   Substance Use Topics    Alcohol use: No     Alcohol/week: 0.0 standard drinks    Drug use: No     Review of Systems   Gastrointestinal: Positive for abdominal pain and nausea.   All other systems reviewed and are negative.      Physical Exam     Initial Vitals [20 1945]   BP Pulse Resp Temp SpO2   116/71 71 17 98.1 °F (36.7 °C) 98 %      MAP       --         Physical Exam    Nursing note and vitals reviewed.  Constitutional: She appears well-developed and well-nourished.   HENT:   Mouth/Throat: Oropharynx is clear and moist.   Cardiovascular: Normal rate and regular rhythm.   Pulmonary/Chest: Breath sounds normal.   Abdominal: Soft.   Patient has mild epigastric tenderness without  rebound or guarding.   Neurological: She is alert and oriented to person, place, and time.   Skin: Skin is warm and dry.         ED Course   Procedures  Labs Reviewed   URINALYSIS, REFLEX TO URINE CULTURE    Narrative:     Specimen Source->Urine   PREGNANCY TEST, URINE RAPID    Narrative:     Specimen Source->Urine          Imaging Results    None          Medical Decision Making:   ED Management:  Patient states that the pain and nausea has subsided after treatment patient wishes to go home to follow-up with the primary care physician                                 Clinical Impression:gastritis       ICD-10-CM ICD-9-CM   1. Gastritis, presence of bleeding unspecified, unspecified chronicity, unspecified gastritis type  K29.70 535.50         Disposition:   Disposition: Discharged     ED Disposition Condition    Discharge Stable        ED Prescriptions     Medication Sig Dispense Start Date End Date Auth. Provider    famotidine (PEPCID) 20 MG tablet Take 1 tablet (20 mg total) by mouth 2 (two) times daily. 60 tablet 8/5/2020 9/4/2020 Vlad Medeiros MD    ondansetron (ZOFRAN) 4 MG tablet Take 1 tablet (4 mg total) by mouth every 6 (six) hours as needed for Nausea. 12 tablet 8/5/2020  Vlad Medeiros MD        Follow-up Information    None                                    Vlad Medeiros MD  08/05/20 9936

## 2020-10-02 DIAGNOSIS — R10.11 ABDOMINAL PAIN, RIGHT UPPER QUADRANT: Primary | ICD-10-CM

## 2020-10-06 ENCOUNTER — HOSPITAL ENCOUNTER (EMERGENCY)
Facility: HOSPITAL | Age: 40
Discharge: HOME | End: 2020-10-06
Attending: FAMILY MEDICINE
Payer: MEDICAID

## 2020-10-06 VITALS
HEIGHT: 62 IN | TEMPERATURE: 98 F | WEIGHT: 231 LBS | OXYGEN SATURATION: 98 % | DIASTOLIC BLOOD PRESSURE: 63 MMHG | HEART RATE: 77 BPM | SYSTOLIC BLOOD PRESSURE: 114 MMHG | BODY MASS INDEX: 42.51 KG/M2 | RESPIRATION RATE: 18 BRPM

## 2020-10-06 DIAGNOSIS — K59.00 CONSTIPATION, UNSPECIFIED CONSTIPATION TYPE: Primary | ICD-10-CM

## 2020-10-06 DIAGNOSIS — R10.9 ABDOMINAL PAIN: ICD-10-CM

## 2020-10-06 LAB
ALBUMIN SERPL BCP-MCNC: 3.7 G/DL (ref 3.5–5.2)
ALP SERPL-CCNC: 64 U/L (ref 55–135)
ALT SERPL W/O P-5'-P-CCNC: 24 U/L (ref 10–44)
ANION GAP SERPL CALC-SCNC: 4 MMOL/L (ref 8–16)
AST SERPL-CCNC: 19 U/L (ref 10–40)
B-HCG UR QL: NEGATIVE
BACTERIA #/AREA URNS HPF: NEGATIVE /HPF
BASOPHILS # BLD AUTO: 0.05 K/UL (ref 0–0.2)
BASOPHILS NFR BLD: 0.5 % (ref 0–1.9)
BILIRUB SERPL-MCNC: 0.4 MG/DL (ref 0.1–1)
BILIRUB UR QL STRIP: ABNORMAL
BUN SERPL-MCNC: 13 MG/DL (ref 6–20)
CALCIUM SERPL-MCNC: 9 MG/DL (ref 8.7–10.5)
CHLORIDE SERPL-SCNC: 106 MMOL/L (ref 95–110)
CLARITY UR: ABNORMAL
CO2 SERPL-SCNC: 29 MMOL/L (ref 23–29)
COLOR UR: ABNORMAL
CREAT SERPL-MCNC: 0.7 MG/DL (ref 0.5–1.4)
DIFFERENTIAL METHOD: NORMAL
EOSINOPHIL # BLD AUTO: 0.5 K/UL (ref 0–0.5)
EOSINOPHIL NFR BLD: 5.5 % (ref 0–8)
ERYTHROCYTE [DISTWIDTH] IN BLOOD BY AUTOMATED COUNT: 13.1 % (ref 11.5–14.5)
EST. GFR  (AFRICAN AMERICAN): >60 ML/MIN/1.73 M^2
EST. GFR  (NON AFRICAN AMERICAN): >60 ML/MIN/1.73 M^2
GLUCOSE SERPL-MCNC: 109 MG/DL (ref 70–110)
GLUCOSE UR QL STRIP: NEGATIVE
HCT VFR BLD AUTO: 38.4 % (ref 37–48.5)
HGB BLD-MCNC: 12.8 G/DL (ref 12–16)
HGB UR QL STRIP: ABNORMAL
HYALINE CASTS #/AREA URNS LPF: 0 /LPF
IMM GRANULOCYTES # BLD AUTO: 0.02 K/UL (ref 0–0.04)
IMM GRANULOCYTES NFR BLD AUTO: 0.2 % (ref 0–0.5)
KETONES UR QL STRIP: NEGATIVE
LACTATE SERPL-SCNC: 0.7 MMOL/L (ref 0.5–2.2)
LEUKOCYTE ESTERASE UR QL STRIP: ABNORMAL
LYMPHOCYTES # BLD AUTO: 3 K/UL (ref 1–4.8)
LYMPHOCYTES NFR BLD: 32.4 % (ref 18–48)
MCH RBC QN AUTO: 27.3 PG (ref 27–31)
MCHC RBC AUTO-ENTMCNC: 33.3 G/DL (ref 32–36)
MCV RBC AUTO: 82 FL (ref 82–98)
MICROSCOPIC COMMENT: ABNORMAL
MONOCYTES # BLD AUTO: 0.8 K/UL (ref 0.3–1)
MONOCYTES NFR BLD: 8.5 % (ref 4–15)
NEUTROPHILS # BLD AUTO: 4.9 K/UL (ref 1.8–7.7)
NEUTROPHILS NFR BLD: 52.9 % (ref 38–73)
NITRITE UR QL STRIP: NEGATIVE
NRBC BLD-RTO: 0 /100 WBC
PH UR STRIP: 5 [PH] (ref 5–8)
PLATELET # BLD AUTO: 221 K/UL (ref 150–350)
PMV BLD AUTO: 11.4 FL (ref 9.2–12.9)
POTASSIUM SERPL-SCNC: 3.5 MMOL/L (ref 3.5–5.1)
PROT SERPL-MCNC: 7.9 G/DL (ref 6–8.4)
PROT UR QL STRIP: ABNORMAL
RBC # BLD AUTO: 4.69 M/UL (ref 4–5.4)
RBC #/AREA URNS HPF: >100 /HPF (ref 0–4)
SODIUM SERPL-SCNC: 139 MMOL/L (ref 136–145)
SP GR UR STRIP: >=1.03 (ref 1–1.03)
SQUAMOUS #/AREA URNS HPF: 10 /HPF
URN SPEC COLLECT METH UR: ABNORMAL
UROBILINOGEN UR STRIP-ACNC: 1 EU/DL
WBC # BLD AUTO: 9.27 K/UL (ref 3.9–12.7)
WBC #/AREA URNS HPF: 34 /HPF (ref 0–5)

## 2020-10-06 PROCEDURE — 85025 COMPLETE CBC W/AUTO DIFF WBC: CPT

## 2020-10-06 PROCEDURE — 99284 EMERGENCY DEPT VISIT MOD MDM: CPT | Mod: 25

## 2020-10-06 PROCEDURE — 81025 URINE PREGNANCY TEST: CPT

## 2020-10-06 PROCEDURE — 63600175 PHARM REV CODE 636 W HCPCS: Performed by: FAMILY MEDICINE

## 2020-10-06 PROCEDURE — 36415 COLL VENOUS BLD VENIPUNCTURE: CPT

## 2020-10-06 PROCEDURE — 81000 URINALYSIS NONAUTO W/SCOPE: CPT

## 2020-10-06 PROCEDURE — 87086 URINE CULTURE/COLONY COUNT: CPT

## 2020-10-06 PROCEDURE — 83605 ASSAY OF LACTIC ACID: CPT

## 2020-10-06 PROCEDURE — 25000003 PHARM REV CODE 250: Performed by: FAMILY MEDICINE

## 2020-10-06 PROCEDURE — 96372 THER/PROPH/DIAG INJ SC/IM: CPT

## 2020-10-06 PROCEDURE — 80053 COMPREHEN METABOLIC PANEL: CPT

## 2020-10-06 RX ORDER — KETOROLAC TROMETHAMINE 30 MG/ML
30 INJECTION, SOLUTION INTRAMUSCULAR; INTRAVENOUS
Status: COMPLETED | OUTPATIENT
Start: 2020-10-06 | End: 2020-10-06

## 2020-10-06 RX ORDER — ONDANSETRON 4 MG/1
8 TABLET, ORALLY DISINTEGRATING ORAL
Status: COMPLETED | OUTPATIENT
Start: 2020-10-06 | End: 2020-10-06

## 2020-10-06 RX ORDER — DICYCLOMINE HYDROCHLORIDE 10 MG/1
20 CAPSULE ORAL
Status: COMPLETED | OUTPATIENT
Start: 2020-10-06 | End: 2020-10-06

## 2020-10-06 RX ORDER — AMOXICILLIN 250 MG
1 CAPSULE ORAL DAILY
Qty: 30 TABLET | Refills: 0 | Status: SHIPPED | OUTPATIENT
Start: 2020-10-06 | End: 2020-12-16

## 2020-10-06 RX ORDER — NAPROXEN 500 MG/1
500 TABLET ORAL 2 TIMES DAILY WITH MEALS
Qty: 60 TABLET | Refills: 0 | Status: SHIPPED | OUTPATIENT
Start: 2020-10-06 | End: 2021-08-25

## 2020-10-06 RX ADMIN — KETOROLAC TROMETHAMINE 30 MG: 30 INJECTION, SOLUTION INTRAMUSCULAR at 10:10

## 2020-10-06 RX ADMIN — ONDANSETRON 8 MG: 4 TABLET, ORALLY DISINTEGRATING ORAL at 10:10

## 2020-10-06 RX ADMIN — DICYCLOMINE HYDROCHLORIDE 20 MG: 10 CAPSULE ORAL at 10:10

## 2020-10-07 NOTE — ED PROVIDER NOTES
Encounter Date: 10/6/2020       History     Chief Complaint   Patient presents with    Abdominal Pain     Right lower abdominal pain. Seen by PCP and given some prescriptions 3 months ago. Worsening since running out of prescription.       Abdominal Pain  The current episode started several weeks ago. The problem has been gradually worsening. The abdominal pain is generalized. The abdominal pain does not radiate. The severity of the abdominal pain is 3/10. The abdominal pain is relieved by nothing. The other symptoms of the illness include nausea. The other symptoms of the illness do not include fever, fatigue, jaundice, melena, vomiting, diarrhea, dysuria, hematemesis, hematochezia, vaginal discharge or vaginal bleeding.   Nausea began 6 to 7 days ago. The nausea is associated with eating.   The patient states that she believes she is currently not pregnant. The patient has had a change in bowel habit. Additional symptoms associated with the illness include constipation. Symptoms associated with the illness do not include chills, anorexia, diaphoresis, heartburn, urgency, hematuria, frequency or back pain.     Review of patient's allergies indicates:  No Known Allergies  Past Medical History:   Diagnosis Date    Calculus of bile duct with obstruction and without cholangitis or cholecystitis 2016    Choledocholithiasis     Cholelithiasis      Past Surgical History:   Procedure Laterality Date     SECTION      CHOLECYSTECTOMY  2016    ENDOSCOPIC RETROGRADE CHOLANGIOPANCREATOGRAPHY W/ SPHINCTEROTOMY AND STONE REMOVAL  2016     Family History   Problem Relation Age of Onset    Gallbladder disease Unknown      Social History     Tobacco Use    Smoking status: Never Smoker    Smokeless tobacco: Never Used   Substance Use Topics    Alcohol use: No     Alcohol/week: 0.0 standard drinks    Drug use: No     Review of Systems   Constitutional: Negative for chills, diaphoresis, fatigue and  fever.   Gastrointestinal: Positive for abdominal pain, constipation and nausea. Negative for anorexia, diarrhea, heartburn, hematemesis, hematochezia, jaundice, melena and vomiting.   Genitourinary: Negative for dysuria, frequency, hematuria, urgency, vaginal bleeding and vaginal discharge.   Musculoskeletal: Negative for back pain.   All other systems reviewed and are negative.      Physical Exam     Initial Vitals [10/06/20 2146]   BP Pulse Resp Temp SpO2   119/85 84 18 98 °F (36.7 °C) 99 %      MAP       --         Physical Exam    Nursing note and vitals reviewed.  Constitutional: Vital signs are normal. She appears well-developed and well-nourished.   HENT:   Head: Normocephalic and atraumatic.   Eyes: Conjunctivae, EOM and lids are normal. Pupils are equal, round, and reactive to light. Lids are everted and swept, no foreign bodies found.   Neck: Trachea normal, normal range of motion, full passive range of motion without pain and phonation normal. Neck supple.   Cardiovascular: Normal rate, regular rhythm, normal heart sounds, intact distal pulses and normal pulses. Exam reveals no gallop.    Pulmonary/Chest: Breath sounds normal. No respiratory distress. She has no wheezes. She has no rhonchi. She has no rales. She exhibits no tenderness.   Abdominal: Soft. Normal appearance and bowel sounds are normal. She exhibits no shifting dullness, no distension, no pulsatile liver, no fluid wave, no abdominal bruit, no ascites, no pulsatile midline mass and no mass. There is no hepatosplenomegaly, splenomegaly or hepatomegaly. There is no abdominal tenderness. There is no rigidity, no rebound, no guarding, no CVA tenderness, no tenderness at McBurney's point and negative Wilkins's sign. No hernia. Hernia confirmed negative in the ventral area, confirmed negative in the right inguinal area and confirmed negative in the left inguinal area.         ED Course   Procedures  Labs Reviewed   COMPREHENSIVE METABOLIC PANEL -  Abnormal; Notable for the following components:       Result Value    Anion Gap 4 (*)     All other components within normal limits   URINALYSIS, REFLEX TO URINE CULTURE - Abnormal; Notable for the following components:    Color, UA Red (*)     Appearance, UA Cloudy (*)     Specific Gravity, UA >=1.030 (*)     Protein, UA 2+ (*)     Bilirubin (UA) 1+ (*)     Occult Blood UA 3+ (*)     Leukocytes, UA 1+ (*)     All other components within normal limits    Narrative:     Specimen Source->Urine   URINALYSIS MICROSCOPIC - Abnormal; Notable for the following components:    RBC, UA >100 (*)     WBC, UA 34 (*)     All other components within normal limits    Narrative:     Specimen Source->Urine   CULTURE, URINE   CBC W/ AUTO DIFFERENTIAL   LACTIC ACID, PLASMA   PREGNANCY TEST, URINE RAPID    Narrative:     Specimen Source->Urine          Imaging Results          X-Ray Abdomen AP 1 View (KUB) (Preliminary result)  Result time 10/06/20 23:08:14    ED Interpretation by Geoff Muir Jr., MD (10/06/20 23:08:14, [facility], Emergency Medicine)    Nonobstructive gas bowel pattern with moderate constipation                               Medical Decision Making:   Clinical Tests:   Lab Tests: Ordered and Reviewed  The following lab test(s) were unremarkable: CBC, CMP, Urinalysis and UPT  Radiological Study: Ordered and Reviewed  ED Management:  Patient has no acute lab abnormalities.  Except for hematuria which is associated with her menstrual.  She is currently on.  X-ray shows diffuse constipation with no small-bowel obstruction.  Patient will be started on stool softener and laxatives.  Discussed with her need for increase dietary fiber and hydration.  Patient reports she understands and will follow-up with PCP if symptoms continue or worsen    Additional MDM:   Differential Diagnosis:   Symptom: Abdominal pain. <> The follow diagnoses were considered and will be evaluated: Cystitis,  Diverticulitis, Epididymitis, Gastritis, Gastroesophageal Reflux, Hepatitis, Ileus, Pancreatitis and Small Bowel Obstruction.                     ED Course as of Oct 07 0209   Tue Oct 06, 2020   2255 RBC, UA(!): >100 [BM]   2256 WBC, UA(!): 34 [BM]   2256 Appearance, UA(!): Cloudy [BM]   2256 Protein, UA(!): 2+ [BM]   2256 Patient is on menstrual cycle.  What she explains RBCs in urine   Leukocytes, UA(!): 1+ [BM]      ED Course User Index  [BM] Geoff Muir Jr., MD            Clinical Impression:       ICD-10-CM ICD-9-CM   1. Constipation, unspecified constipation type  K59.00 564.00   2. Abdominal pain  R10.9 789.00                      Disposition:   Disposition: Discharged  Condition: Stable     ED Disposition Condition    Discharge Stable        ED Prescriptions     Medication Sig Dispense Start Date End Date Auth. Provider    senna-docusate 8.6-50 mg (SENNA WITH DOCUSATE SODIUM) 8.6-50 mg per tablet Take 1 tablet by mouth once daily. 30 tablet 10/6/2020  Geoff Muir Jr., MD    naproxen (NAPROSYN) 500 MG tablet Take 1 tablet (500 mg total) by mouth 2 (two) times daily with meals. 60 tablet 10/6/2020  Geoff Muir Jr., MD        Follow-up Information     Follow up With Specialties Details Why Contact Info    PCP                                           Geoff Muir Jr., MD  10/07/20 0090

## 2020-12-16 PROBLEM — K59.00 CONSTIPATION: Status: ACTIVE | Noted: 2020-12-16

## 2020-12-16 PROBLEM — R10.11 RUQ PAIN: Status: ACTIVE | Noted: 2020-12-16

## 2020-12-21 ENCOUNTER — HOSPITAL ENCOUNTER (EMERGENCY)
Facility: HOSPITAL | Age: 40
Discharge: HOME | End: 2020-12-21
Attending: EMERGENCY MEDICINE
Payer: MEDICAID

## 2020-12-21 VITALS
WEIGHT: 218 LBS | RESPIRATION RATE: 18 BRPM | HEIGHT: 62 IN | OXYGEN SATURATION: 98 % | SYSTOLIC BLOOD PRESSURE: 126 MMHG | DIASTOLIC BLOOD PRESSURE: 86 MMHG | BODY MASS INDEX: 40.12 KG/M2 | HEART RATE: 73 BPM | TEMPERATURE: 98 F

## 2020-12-21 DIAGNOSIS — K21.9 GASTROESOPHAGEAL REFLUX DISEASE, UNSPECIFIED WHETHER ESOPHAGITIS PRESENT: Primary | ICD-10-CM

## 2020-12-21 LAB
ALBUMIN SERPL BCP-MCNC: 3.8 G/DL (ref 3.5–5.2)
ALP SERPL-CCNC: 80 U/L (ref 55–135)
ALT SERPL W/O P-5'-P-CCNC: 99 U/L (ref 10–44)
ANION GAP SERPL CALC-SCNC: 2 MMOL/L (ref 8–16)
AST SERPL-CCNC: 203 U/L (ref 10–40)
BASOPHILS # BLD AUTO: 0.06 K/UL (ref 0–0.2)
BASOPHILS NFR BLD: 0.5 % (ref 0–1.9)
BILIRUB SERPL-MCNC: 1 MG/DL (ref 0.1–1)
BILIRUB UR QL STRIP: NEGATIVE
BUN SERPL-MCNC: 12 MG/DL (ref 6–20)
CALCIUM SERPL-MCNC: 9.2 MG/DL (ref 8.7–10.5)
CHLORIDE SERPL-SCNC: 106 MMOL/L (ref 95–110)
CLARITY UR: CLEAR
CO2 SERPL-SCNC: 32 MMOL/L (ref 23–29)
COLOR UR: YELLOW
CREAT SERPL-MCNC: 0.8 MG/DL (ref 0.5–1.4)
DIFFERENTIAL METHOD: ABNORMAL
EOSINOPHIL # BLD AUTO: 0.3 K/UL (ref 0–0.5)
EOSINOPHIL NFR BLD: 2.1 % (ref 0–8)
ERYTHROCYTE [DISTWIDTH] IN BLOOD BY AUTOMATED COUNT: 13.5 % (ref 11.5–14.5)
EST. GFR  (AFRICAN AMERICAN): >60 ML/MIN/1.73 M^2
EST. GFR  (NON AFRICAN AMERICAN): >60 ML/MIN/1.73 M^2
GLUCOSE SERPL-MCNC: 105 MG/DL (ref 70–110)
GLUCOSE UR QL STRIP: NEGATIVE
HCT VFR BLD AUTO: 37.7 % (ref 37–48.5)
HGB BLD-MCNC: 12.5 G/DL (ref 12–16)
HGB UR QL STRIP: NEGATIVE
IMM GRANULOCYTES # BLD AUTO: 0.04 K/UL (ref 0–0.04)
IMM GRANULOCYTES NFR BLD AUTO: 0.3 % (ref 0–0.5)
KETONES UR QL STRIP: NEGATIVE
LEUKOCYTE ESTERASE UR QL STRIP: NEGATIVE
LIPASE SERPL-CCNC: 285 U/L (ref 23–300)
LYMPHOCYTES # BLD AUTO: 1.8 K/UL (ref 1–4.8)
LYMPHOCYTES NFR BLD: 13.7 % (ref 18–48)
MCH RBC QN AUTO: 26.7 PG (ref 27–31)
MCHC RBC AUTO-ENTMCNC: 33.2 G/DL (ref 32–36)
MCV RBC AUTO: 81 FL (ref 82–98)
MONOCYTES # BLD AUTO: 1.1 K/UL (ref 0.3–1)
MONOCYTES NFR BLD: 8.1 % (ref 4–15)
NEUTROPHILS # BLD AUTO: 9.9 K/UL (ref 1.8–7.7)
NEUTROPHILS NFR BLD: 75.3 % (ref 38–73)
NITRITE UR QL STRIP: NEGATIVE
NRBC BLD-RTO: 0 /100 WBC
PH UR STRIP: 6 [PH] (ref 5–8)
PLATELET # BLD AUTO: 211 K/UL (ref 150–350)
PMV BLD AUTO: 11 FL (ref 9.2–12.9)
POTASSIUM SERPL-SCNC: 3.6 MMOL/L (ref 3.5–5.1)
PROT SERPL-MCNC: 7.6 G/DL (ref 6–8.4)
PROT UR QL STRIP: NEGATIVE
RBC # BLD AUTO: 4.68 M/UL (ref 4–5.4)
SODIUM SERPL-SCNC: 140 MMOL/L (ref 136–145)
SP GR UR STRIP: 1.02 (ref 1–1.03)
URN SPEC COLLECT METH UR: NORMAL
UROBILINOGEN UR STRIP-ACNC: 1 EU/DL
WBC # BLD AUTO: 13.15 K/UL (ref 3.9–12.7)

## 2020-12-21 PROCEDURE — 36415 COLL VENOUS BLD VENIPUNCTURE: CPT

## 2020-12-21 PROCEDURE — 80053 COMPREHEN METABOLIC PANEL: CPT

## 2020-12-21 PROCEDURE — 99284 EMERGENCY DEPT VISIT MOD MDM: CPT | Mod: 25

## 2020-12-21 PROCEDURE — 25000003 PHARM REV CODE 250: Performed by: CLINICAL NURSE SPECIALIST

## 2020-12-21 PROCEDURE — 83690 ASSAY OF LIPASE: CPT

## 2020-12-21 PROCEDURE — 85025 COMPLETE CBC W/AUTO DIFF WBC: CPT

## 2020-12-21 PROCEDURE — 81003 URINALYSIS AUTO W/O SCOPE: CPT

## 2020-12-21 RX ORDER — OMEPRAZOLE 20 MG/1
20 CAPSULE, DELAYED RELEASE ORAL DAILY
Qty: 30 CAPSULE | Refills: 0 | Status: SHIPPED | OUTPATIENT
Start: 2020-12-21 | End: 2021-08-25

## 2020-12-21 RX ADMIN — LIDOCAINE HYDROCHLORIDE: 20 SOLUTION ORAL; TOPICAL at 06:12

## 2020-12-21 NOTE — ED PROVIDER NOTES
Encounter Date: 2020       History     Chief Complaint   Patient presents with    Abdominal Pain     Patient to the ER with complaints right upper abdominal pain that radiates throughout abdomen. Reports the pain has been going on since July but seems to be worsening. Has been on medications for gastritis and constipation with no relief of symtoms. Ultrasound scheduled on the .     Nel Ayala is an 40 y.o. female who complains of upper epigastric pain. Symptoms began in July on and off but worsening.  Patient was seen here for constipation and gastritis given medications with no improvement of pain.  PCP has patient scheduled for imaging of her stomach on .  Last bowel movement this morning.  History of cholecystectomy        Review of patient's allergies indicates:  No Known Allergies  Past Medical History:   Diagnosis Date    Calculus of bile duct with obstruction and without cholangitis or cholecystitis 2016    Choledocholithiasis     Cholelithiasis      Past Surgical History:   Procedure Laterality Date     SECTION      CHOLECYSTECTOMY  2016    ENDOSCOPIC RETROGRADE CHOLANGIOPANCREATOGRAPHY W/ SPHINCTEROTOMY AND STONE REMOVAL  2016     Family History   Problem Relation Age of Onset    Gallbladder disease Unknown     Skin cancer Mother     Colon cancer Father 56     Social History     Tobacco Use    Smoking status: Never Smoker    Smokeless tobacco: Never Used   Substance Use Topics    Alcohol use: No     Alcohol/week: 0.0 standard drinks    Drug use: No     Review of Systems   Constitutional: Negative for fever.   HENT: Negative for sore throat.    Respiratory: Negative for shortness of breath.    Cardiovascular: Negative for chest pain.   Gastrointestinal: Positive for abdominal distention and abdominal pain. Negative for nausea.   Genitourinary: Negative for dysuria.   Musculoskeletal: Negative for back pain.   Skin: Negative for rash.    Neurological: Negative for weakness.   Hematological: Does not bruise/bleed easily.   All other systems reviewed and are negative.      Physical Exam     Initial Vitals [12/21/20 1730]   BP Pulse Resp Temp SpO2   120/74 91 18 98.4 °F (36.9 °C) 99 %      MAP       --         Physical Exam    Nursing note and vitals reviewed.  Constitutional: She appears well-developed and well-nourished.   HENT:   Head: Normocephalic and atraumatic.   Eyes: Pupils are equal, round, and reactive to light.   Neck: Normal range of motion.   Cardiovascular: Normal rate and regular rhythm.   Pulmonary/Chest: Breath sounds normal.   Abdominal: Soft. Bowel sounds are normal. She exhibits distension. There is abdominal tenderness.   Musculoskeletal: Normal range of motion.   Neurological: She is alert and oriented to person, place, and time.   Skin: Skin is warm and dry.   Psychiatric: She has a normal mood and affect.         ED Course   Procedures  Labs Reviewed   CBC W/ AUTO DIFFERENTIAL - Abnormal; Notable for the following components:       Result Value    WBC 13.15 (*)     MCV 81 (*)     MCH 26.7 (*)     Gran # (ANC) 9.9 (*)     Mono # 1.1 (*)     Gran % 75.3 (*)     Lymph % 13.7 (*)     All other components within normal limits   COMPREHENSIVE METABOLIC PANEL - Abnormal; Notable for the following components:    CO2 32 (*)      (*)     ALT 99 (*)     Anion Gap 2 (*)     All other components within normal limits   LIPASE   URINALYSIS, REFLEX TO URINE CULTURE    Narrative:     Specimen Source->Urine          Imaging Results          CT Abdomen Pelvis  Without Contrast (In process)                  Medical Decision Making:   Differential Diagnosis:   Pancreatitis, GERD, diverticulitis  Clinical Tests:   Lab Tests: Ordered and Reviewed  Radiological Study: Ordered and Reviewed                             Clinical Impression:     ICD-10-CM ICD-9-CM   1. Gastroesophageal reflux disease, unspecified whether esophagitis present  K21.9  530.81                          ED Disposition Condition    Discharge Stable        ED Prescriptions     Medication Sig Dispense Start Date End Date Auth. Provider    omeprazole (PRILOSEC) 20 MG capsule Take 1 capsule (20 mg total) by mouth once daily. 30 capsule 12/21/2020 12/21/2021 Alberta Claros NP        Follow-up Information    None                                      Alberta Claros NP  12/21/20 6673

## 2021-08-25 ENCOUNTER — OFFICE VISIT (OUTPATIENT)
Dept: OBSTETRICS AND GYNECOLOGY | Facility: CLINIC | Age: 41
End: 2021-08-25
Payer: MEDICAID

## 2021-08-25 VITALS
DIASTOLIC BLOOD PRESSURE: 95 MMHG | SYSTOLIC BLOOD PRESSURE: 137 MMHG | BODY MASS INDEX: 45.27 KG/M2 | HEART RATE: 75 BPM | HEIGHT: 62 IN | WEIGHT: 246 LBS

## 2021-08-25 DIAGNOSIS — Z12.4 SCREENING FOR MALIGNANT NEOPLASM OF THE CERVIX: Primary | ICD-10-CM

## 2021-08-25 DIAGNOSIS — Z01.419 ENCOUNTER FOR ANNUAL ROUTINE GYNECOLOGICAL EXAMINATION: ICD-10-CM

## 2021-08-25 PROCEDURE — 99999 PR PBB SHADOW E&M-EST. PATIENT-LVL II: ICD-10-PCS | Mod: PBBFAC,,, | Performed by: OBSTETRICS & GYNECOLOGY

## 2021-08-25 PROCEDURE — 99999 PR PBB SHADOW E&M-EST. PATIENT-LVL II: CPT | Mod: PBBFAC,,, | Performed by: OBSTETRICS & GYNECOLOGY

## 2021-08-25 PROCEDURE — 99385 PR PREVENTIVE VISIT,NEW,18-39: ICD-10-PCS | Mod: S$PBB,,, | Performed by: OBSTETRICS & GYNECOLOGY

## 2021-08-25 PROCEDURE — 99212 OFFICE O/P EST SF 10 MIN: CPT | Mod: PBBFAC | Performed by: OBSTETRICS & GYNECOLOGY

## 2021-08-25 PROCEDURE — 99385 PREV VISIT NEW AGE 18-39: CPT | Mod: S$PBB,,, | Performed by: OBSTETRICS & GYNECOLOGY

## 2021-09-13 LAB
HPV16+18+H RISK 12 DNA CVX-IMP: NORMAL
LIQUID BASED PAP SMEAR, SCREENING: NORMAL

## 2021-11-12 ENCOUNTER — HOSPITAL ENCOUNTER (EMERGENCY)
Facility: HOSPITAL | Age: 41
Discharge: HOME | End: 2021-11-12
Attending: EMERGENCY MEDICINE
Payer: MEDICAID

## 2021-11-12 VITALS
BODY MASS INDEX: 44.53 KG/M2 | DIASTOLIC BLOOD PRESSURE: 82 MMHG | TEMPERATURE: 98 F | RESPIRATION RATE: 18 BRPM | HEART RATE: 91 BPM | HEIGHT: 62 IN | OXYGEN SATURATION: 98 % | SYSTOLIC BLOOD PRESSURE: 118 MMHG | WEIGHT: 242 LBS

## 2021-11-12 DIAGNOSIS — R51.9 NONINTRACTABLE HEADACHE, UNSPECIFIED CHRONICITY PATTERN, UNSPECIFIED HEADACHE TYPE: Primary | ICD-10-CM

## 2021-11-12 PROCEDURE — 99284 EMERGENCY DEPT VISIT MOD MDM: CPT | Mod: 25

## 2021-11-12 PROCEDURE — 96374 THER/PROPH/DIAG INJ IV PUSH: CPT

## 2021-11-12 PROCEDURE — 63600175 PHARM REV CODE 636 W HCPCS: Performed by: CLINICAL NURSE SPECIALIST

## 2021-11-12 PROCEDURE — 96372 THER/PROPH/DIAG INJ SC/IM: CPT | Mod: 59

## 2021-11-12 RX ORDER — PROCHLORPERAZINE EDISYLATE 5 MG/ML
2.5 INJECTION INTRAMUSCULAR; INTRAVENOUS
Status: COMPLETED | OUTPATIENT
Start: 2021-11-12 | End: 2021-11-12

## 2021-11-12 RX ORDER — BUTALBITAL, ACETAMINOPHEN AND CAFFEINE 50; 325; 40 MG/1; MG/1; MG/1
1 TABLET ORAL EVERY 4 HOURS PRN
Qty: 10 TABLET | Refills: 0 | Status: SHIPPED | OUTPATIENT
Start: 2021-11-12 | End: 2021-12-12

## 2021-11-12 RX ORDER — KETOROLAC TROMETHAMINE 30 MG/ML
30 INJECTION, SOLUTION INTRAMUSCULAR; INTRAVENOUS
Status: COMPLETED | OUTPATIENT
Start: 2021-11-12 | End: 2021-11-12

## 2021-11-12 RX ORDER — DIPHENHYDRAMINE HYDROCHLORIDE 50 MG/ML
25 INJECTION INTRAMUSCULAR; INTRAVENOUS
Status: COMPLETED | OUTPATIENT
Start: 2021-11-12 | End: 2021-11-12

## 2021-11-12 RX ADMIN — KETOROLAC TROMETHAMINE 30 MG: 30 INJECTION, SOLUTION INTRAMUSCULAR; INTRAVENOUS at 01:11

## 2021-11-12 RX ADMIN — DIPHENHYDRAMINE HYDROCHLORIDE 25 MG: 50 INJECTION, SOLUTION INTRAMUSCULAR; INTRAVENOUS at 01:11

## 2021-11-12 RX ADMIN — PROCHLORPERAZINE EDISYLATE 2.5 MG: 5 INJECTION INTRAMUSCULAR; INTRAVENOUS at 01:11

## 2022-03-24 ENCOUNTER — HOSPITAL ENCOUNTER (EMERGENCY)
Facility: HOSPITAL | Age: 42
Discharge: HOME | End: 2022-03-24
Attending: EMERGENCY MEDICINE
Payer: MEDICAID

## 2022-03-24 VITALS
RESPIRATION RATE: 18 BRPM | DIASTOLIC BLOOD PRESSURE: 86 MMHG | TEMPERATURE: 98 F | BODY MASS INDEX: 44.27 KG/M2 | SYSTOLIC BLOOD PRESSURE: 134 MMHG | HEART RATE: 74 BPM | OXYGEN SATURATION: 100 % | WEIGHT: 242.06 LBS

## 2022-03-24 DIAGNOSIS — R19.7 VOMITING AND DIARRHEA: Primary | ICD-10-CM

## 2022-03-24 DIAGNOSIS — R11.10 VOMITING AND DIARRHEA: Primary | ICD-10-CM

## 2022-03-24 PROCEDURE — 99284 EMERGENCY DEPT VISIT MOD MDM: CPT

## 2022-03-24 RX ORDER — ONDANSETRON 4 MG/1
4 TABLET, ORALLY DISINTEGRATING ORAL EVERY 8 HOURS PRN
Qty: 6 TABLET | Refills: 0 | Status: SHIPPED | OUTPATIENT
Start: 2022-03-24 | End: 2022-03-26

## 2022-03-24 RX ORDER — DICYCLOMINE HYDROCHLORIDE 20 MG/1
20 TABLET ORAL 2 TIMES DAILY
Qty: 6 TABLET | Refills: 0 | Status: SHIPPED | OUTPATIENT
Start: 2022-03-24 | End: 2022-03-27

## 2022-03-24 NOTE — ED PROVIDER NOTES
Encounter Date: 3/24/2022       History     Chief Complaint   Patient presents with    Vomiting     N/v/d that started lastnight others sick in the house with same symptoms     This is a 41-year-old  female with a history of cholecystectomy who presents to the emergency department with complaints of vomiting and diarrhea that began last night.  Patient reports nausea, decreased appetite, associated with vomiting and diarrhea.  Patient reports her daughter is experiencing similar symptoms as well.  Patient denies URI signs and symptoms, fever, or black/bloody bowel movements.        Review of patient's allergies indicates:  No Known Allergies  Past Medical History:   Diagnosis Date    Calculus of bile duct with obstruction and without cholangitis or cholecystitis 2016    Choledocholithiasis     Cholelithiasis      Past Surgical History:   Procedure Laterality Date     SECTION      CHOLECYSTECTOMY  2016    ENDOSCOPIC RETROGRADE CHOLANGIOPANCREATOGRAPHY W/ SPHINCTEROTOMY AND STONE REMOVAL  2016     Family History   Problem Relation Age of Onset    Gallbladder disease Unknown     Skin cancer Mother     Colon cancer Father 56     Social History     Tobacco Use    Smoking status: Never Smoker    Smokeless tobacco: Never Used   Substance Use Topics    Alcohol use: No     Alcohol/week: 0.0 standard drinks    Drug use: No     Review of Systems   Constitutional: Positive for appetite change. Negative for fever.   HENT: Negative.    Respiratory: Negative.    Cardiovascular: Negative.    Gastrointestinal: Positive for abdominal pain (Upper prior to vomiting), diarrhea, nausea and vomiting. Negative for blood in stool and constipation.   Musculoskeletal: Negative.    Neurological: Negative.        Physical Exam     Initial Vitals [22 1306]   BP Pulse Resp Temp SpO2   134/86 74 18 98.3 °F (36.8 °C) 100 %      MAP       --         Physical Exam    Nursing note and vitals  reviewed.  Constitutional: She appears well-developed and well-nourished. She is active. No distress.   HENT:   Head: Normocephalic and atraumatic.   Mouth/Throat: Oropharynx is clear and moist. No oropharyngeal exudate.   Eyes: EOM are normal. Pupils are equal, round, and reactive to light.   Neck: Neck supple.   Normal range of motion.  Cardiovascular: Normal rate, regular rhythm and normal heart sounds.   Pulmonary/Chest: Breath sounds normal. No respiratory distress.   Abdominal: Abdomen is soft. Bowel sounds are normal. She exhibits no distension. There is no abdominal tenderness. There is no rebound.   Musculoskeletal:         General: Normal range of motion.      Cervical back: Normal range of motion and neck supple.     Neurological: She is alert and oriented to person, place, and time. GCS score is 15. GCS eye subscore is 4. GCS verbal subscore is 5. GCS motor subscore is 6.   Skin: Skin is warm and dry. Capillary refill takes less than 2 seconds.   Psychiatric: She has a normal mood and affect. Her behavior is normal. Thought content normal.         ED Course   Procedures  Labs Reviewed - No data to display       Imaging Results    None          Medications - No data to display                       Clinical Impression:   Final diagnoses:  [R11.10, R19.7] Vomiting and diarrhea (Primary)          ED Disposition Condition    Discharge Stable        ED Prescriptions     Medication Sig Dispense Start Date End Date Auth. Provider    ondansetron (ZOFRAN-ODT) 4 MG TbDL Take 1 tablet (4 mg total) by mouth every 8 (eight) hours as needed. 6 tablet 3/24/2022 3/26/2022 Keshia Simpson NP    dicyclomine (BENTYL) 20 mg tablet Take 1 tablet (20 mg total) by mouth 2 (two) times daily. for 3 days 6 tablet 3/24/2022 3/27/2022 Keshia Simpson NP        Follow-up Information     Follow up With Specialties Details Why Contact Info    PCP Follow UP  Schedule an appointment as soon as possible for a visit in 2 days for  follow-up, for re-evaluation of today's complaint            Keshia Simpson, PATRICK  03/24/22 8491

## 2022-03-24 NOTE — Clinical Note
"Nel"Sohan Ayala was seen and treated in our emergency department on 3/24/2022.  She may return to work on 03/26/2022.       If you have any questions or concerns, please don't hesitate to call.      Keshia Simpson NP"

## 2022-03-24 NOTE — DISCHARGE INSTRUCTIONS
Take medications as directed and be sure to follow a bland diet while experiencing symptoms of vomiting and diarrhea.  See your primary doctor in 1-2 days, and return to the emergency room for worsening condition.

## 2022-09-01 ENCOUNTER — HOSPITAL ENCOUNTER (EMERGENCY)
Facility: HOSPITAL | Age: 42
Discharge: HOME | End: 2022-09-01
Attending: STUDENT IN AN ORGANIZED HEALTH CARE EDUCATION/TRAINING PROGRAM
Payer: MEDICAID

## 2022-09-01 VITALS
BODY MASS INDEX: 47.11 KG/M2 | HEART RATE: 72 BPM | SYSTOLIC BLOOD PRESSURE: 152 MMHG | HEIGHT: 62 IN | OXYGEN SATURATION: 100 % | TEMPERATURE: 98 F | RESPIRATION RATE: 18 BRPM | DIASTOLIC BLOOD PRESSURE: 102 MMHG | WEIGHT: 256 LBS

## 2022-09-01 DIAGNOSIS — N30.00 ACUTE CYSTITIS WITHOUT HEMATURIA: Primary | ICD-10-CM

## 2022-09-01 LAB
B-HCG UR QL: NEGATIVE
BACTERIA #/AREA URNS HPF: ABNORMAL /HPF
BILIRUB UR QL STRIP: NEGATIVE
CLARITY UR: CLEAR
COLOR UR: YELLOW
GLUCOSE UR QL STRIP: NEGATIVE
HGB UR QL STRIP: NEGATIVE
HYALINE CASTS #/AREA URNS LPF: 34.1 /LPF
KETONES UR QL STRIP: ABNORMAL
LEUKOCYTE ESTERASE UR QL STRIP: ABNORMAL
MICROSCOPIC COMMENT: ABNORMAL
NITRITE UR QL STRIP: NEGATIVE
PH UR STRIP: 7 [PH] (ref 5–8)
PROT UR QL STRIP: NEGATIVE
RBC #/AREA URNS HPF: 1 /HPF (ref 0–4)
SP GR UR STRIP: 1.02 (ref 1–1.03)
SQUAMOUS #/AREA URNS HPF: 1 /HPF
URN SPEC COLLECT METH UR: ABNORMAL
UROBILINOGEN UR STRIP-ACNC: 1 EU/DL
WBC #/AREA URNS HPF: >100 /HPF (ref 0–5)

## 2022-09-01 PROCEDURE — 81000 URINALYSIS NONAUTO W/SCOPE: CPT | Performed by: STUDENT IN AN ORGANIZED HEALTH CARE EDUCATION/TRAINING PROGRAM

## 2022-09-01 PROCEDURE — 87088 URINE BACTERIA CULTURE: CPT | Performed by: STUDENT IN AN ORGANIZED HEALTH CARE EDUCATION/TRAINING PROGRAM

## 2022-09-01 PROCEDURE — 25000003 PHARM REV CODE 250: Performed by: STUDENT IN AN ORGANIZED HEALTH CARE EDUCATION/TRAINING PROGRAM

## 2022-09-01 PROCEDURE — 96372 THER/PROPH/DIAG INJ SC/IM: CPT | Performed by: STUDENT IN AN ORGANIZED HEALTH CARE EDUCATION/TRAINING PROGRAM

## 2022-09-01 PROCEDURE — 63600175 PHARM REV CODE 636 W HCPCS: Performed by: STUDENT IN AN ORGANIZED HEALTH CARE EDUCATION/TRAINING PROGRAM

## 2022-09-01 PROCEDURE — 87086 URINE CULTURE/COLONY COUNT: CPT | Performed by: STUDENT IN AN ORGANIZED HEALTH CARE EDUCATION/TRAINING PROGRAM

## 2022-09-01 PROCEDURE — 81025 URINE PREGNANCY TEST: CPT | Performed by: STUDENT IN AN ORGANIZED HEALTH CARE EDUCATION/TRAINING PROGRAM

## 2022-09-01 PROCEDURE — 99284 EMERGENCY DEPT VISIT MOD MDM: CPT

## 2022-09-01 PROCEDURE — 87077 CULTURE AEROBIC IDENTIFY: CPT | Performed by: STUDENT IN AN ORGANIZED HEALTH CARE EDUCATION/TRAINING PROGRAM

## 2022-09-01 PROCEDURE — 87186 SC STD MICRODIL/AGAR DIL: CPT | Performed by: STUDENT IN AN ORGANIZED HEALTH CARE EDUCATION/TRAINING PROGRAM

## 2022-09-01 RX ORDER — CEFDINIR 300 MG/1
300 CAPSULE ORAL EVERY 12 HOURS
Qty: 20 CAPSULE | Refills: 0 | Status: SHIPPED | OUTPATIENT
Start: 2022-09-01 | End: 2022-09-11

## 2022-09-01 RX ORDER — LIDOCAINE HYDROCHLORIDE 10 MG/ML
1 INJECTION INFILTRATION; PERINEURAL ONCE
Status: COMPLETED | OUTPATIENT
Start: 2022-09-01 | End: 2022-09-01

## 2022-09-01 RX ORDER — CEFTRIAXONE 1 G/1
1 INJECTION, POWDER, FOR SOLUTION INTRAMUSCULAR; INTRAVENOUS ONCE
Status: COMPLETED | OUTPATIENT
Start: 2022-09-01 | End: 2022-09-01

## 2022-09-01 RX ADMIN — LIDOCAINE HYDROCHLORIDE 1 ML: 10 INJECTION, SOLUTION EPIDURAL; INFILTRATION; INTRACAUDAL; PERINEURAL at 08:09

## 2022-09-01 RX ADMIN — CEFTRIAXONE SODIUM 1 G: 1 INJECTION, POWDER, FOR SOLUTION INTRAMUSCULAR; INTRAVENOUS at 08:09

## 2022-09-02 NOTE — ED PROVIDER NOTES
History  Chief Complaint   Patient presents with    Back Pain    Dysuria     PT stated that for the past couple days she has been experiencing right sided back pain radiating to lower abdomen with dysuria / nausea - noted some burning with urination.      41-year-old female with history of cholelithiasis who presents due to dysuria and mild flank pain.  Symptoms been ongoing for the past 3 days.  Patient reports burning with urination and feels the pain is radiating up towards her right flank.  All other systems reviewed and noted to be negative.  No medications taken outpatient setting for symptom relief.      Past Medical History:   Diagnosis Date    Calculus of bile duct with obstruction and without cholangitis or cholecystitis 2016    Choledocholithiasis     Cholelithiasis        Past Surgical History:   Procedure Laterality Date     SECTION      CHOLECYSTECTOMY  2016    ENDOSCOPIC RETROGRADE CHOLANGIOPANCREATOGRAPHY W/ SPHINCTEROTOMY AND STONE REMOVAL  2016       Family History   Problem Relation Age of Onset    Gallbladder disease Unknown     Skin cancer Mother     Colon cancer Father 56       Social History     Tobacco Use    Smoking status: Never    Smokeless tobacco: Never   Substance Use Topics    Alcohol use: No     Alcohol/week: 0.0 standard drinks    Drug use: No       ROS  Review of Systems   Constitutional:  Negative for fever.   HENT:  Negative for congestion.    Eyes:  Negative for visual disturbance.   Respiratory:  Negative for cough and shortness of breath.    Cardiovascular:  Negative for chest pain.   Gastrointestinal:  Negative for abdominal pain, nausea and vomiting.   Genitourinary:  Positive for dysuria and flank pain.   Musculoskeletal:  Negative for arthralgias.   Skin:  Negative for color change.   Allergic/Immunologic: Negative for immunocompromised state.   Neurological:  Negative for headaches.   Hematological:  Negative for adenopathy. Does not bruise/bleed  "easily.     Physical Exam  BP (!) 152/102   Pulse 72   Temp 97.6 °F (36.4 °C)   Resp 18   Ht 5' 2" (1.575 m)   Wt 116.1 kg (256 lb)   LMP 08/15/2022   SpO2 100%   Breastfeeding No   BMI 46.82 kg/m²   Physical Exam    Constitutional: She appears well-developed and well-nourished. She is cooperative.   HENT:   Head: Normocephalic and atraumatic.   Eyes: Conjunctivae, EOM and lids are normal. Pupils are equal, round, and reactive to light.   Neck: Phonation normal.   Normal range of motion.  Cardiovascular:  Normal rate, regular rhythm and intact distal pulses.           Pulmonary/Chest: Breath sounds normal. No stridor. No respiratory distress.   Abdominal: Abdomen is soft. There is no abdominal tenderness.   Musculoskeletal:         General: No tenderness. Normal range of motion.      Cervical back: Normal range of motion.     Neurological: She is alert and oriented to person, place, and time.   Skin: Skin is warm and dry.   Psychiatric: She has a normal mood and affect. Her speech is normal and behavior is normal.             Labs Reviewed   URINALYSIS, REFLEX TO URINE CULTURE - Abnormal; Notable for the following components:       Result Value    Ketones, UA Trace (*)     Leukocytes, UA 2+ (*)     All other components within normal limits    Narrative:     Preferred Collection Type->Urine, Clean Catch  Specimen Source->Urine   URINALYSIS MICROSCOPIC - Abnormal; Notable for the following components:    WBC, UA >100 (*)     Bacteria Many (*)     Hyaline Casts, UA 34.1 (*)     All other components within normal limits    Narrative:     Preferred Collection Type->Urine, Clean Catch  Specimen Source->Urine   CULTURE, URINE   PREGNANCY TEST, URINE RAPID    Narrative:     Specimen Source->Urine                          Procedures    ED Course as of 09/01/22 2024   u Sep 01, 2022   2005 Leukocytes, UA(!): 2+ [NA]   2005 WBC, UA(!): >100 [NA]   2005 Bacteria, UA(!): Many [NA]   2020 Will give Rocephin IM and " prescription for the outpatient setting.  Patient will be discharged. [NA]      ED Course User Index  [NA] Ana Leger MD            [facility]        Clinical Impression  The encounter diagnosis was Acute cystitis without hematuria.       Ana Leger MD  09/01/22 2024

## 2022-09-04 LAB — BACTERIA UR CULT: ABNORMAL

## 2022-09-26 ENCOUNTER — HOSPITAL ENCOUNTER (EMERGENCY)
Facility: HOSPITAL | Age: 42
Discharge: HOME | End: 2022-09-26
Attending: EMERGENCY MEDICINE
Payer: MEDICAID

## 2022-09-26 VITALS
RESPIRATION RATE: 18 BRPM | SYSTOLIC BLOOD PRESSURE: 134 MMHG | TEMPERATURE: 99 F | BODY MASS INDEX: 46.13 KG/M2 | WEIGHT: 252.19 LBS | OXYGEN SATURATION: 99 % | HEART RATE: 70 BPM | DIASTOLIC BLOOD PRESSURE: 85 MMHG

## 2022-09-26 DIAGNOSIS — J10.1 INFLUENZA A: Primary | ICD-10-CM

## 2022-09-26 LAB
CTP QC/QA: YES
CTP QC/QA: YES
POC MOLECULAR INFLUENZA A AGN: POSITIVE
POC MOLECULAR INFLUENZA B AGN: NEGATIVE
SARS-COV-2 RDRP RESP QL NAA+PROBE: NEGATIVE

## 2022-09-26 PROCEDURE — 99284 EMERGENCY DEPT VISIT MOD MDM: CPT | Mod: 25

## 2022-09-26 PROCEDURE — 87502 INFLUENZA DNA AMP PROBE: CPT

## 2022-09-26 PROCEDURE — 63600175 PHARM REV CODE 636 W HCPCS: Performed by: EMERGENCY MEDICINE

## 2022-09-26 PROCEDURE — U0002 COVID-19 LAB TEST NON-CDC: HCPCS | Performed by: EMERGENCY MEDICINE

## 2022-09-26 RX ORDER — PROMETHAZINE HYDROCHLORIDE AND DEXTROMETHORPHAN HYDROBROMIDE 6.25; 15 MG/5ML; MG/5ML
5 SYRUP ORAL EVERY 6 HOURS PRN
Qty: 120 ML | Refills: 0 | Status: SHIPPED | OUTPATIENT
Start: 2022-09-26 | End: 2022-10-06

## 2022-09-26 RX ORDER — BALOXAVIR MARBOXIL 80 MG/1
80 TABLET, FILM COATED ORAL ONCE
Qty: 1 TABLET | Refills: 0 | Status: SHIPPED | OUTPATIENT
Start: 2022-09-26 | End: 2022-09-26

## 2022-09-26 RX ORDER — PREDNISONE 20 MG/1
40 TABLET ORAL
Status: COMPLETED | OUTPATIENT
Start: 2022-09-26 | End: 2022-09-26

## 2022-09-26 RX ADMIN — PREDNISONE 40 MG: 20 TABLET ORAL at 09:09

## 2022-09-26 NOTE — ED PROVIDER NOTES
Encounter Date: 2022       History     Chief Complaint   Patient presents with    Cough     Pt presents to the ER w/ complaints of cough, sore throat, and R ear pain. Pt states that her cough began approx 1 week ago, sore throat and ear pain x 2 days. Pt reports children at home w/ both flu and covid. Pt reports attempting to treat her symptoms various otc medication, denies any improvement.     41 yo female here via POV with cough and congestion x 1 week, but worse x 48 hours after exposure to influenza and covid last week. No fever, endorses chills. OTC meds helping. No aggravating factors.     Review of patient's allergies indicates:  No Known Allergies  Past Medical History:   Diagnosis Date    Calculus of bile duct with obstruction and without cholangitis or cholecystitis 2016    Choledocholithiasis     Cholelithiasis      Past Surgical History:   Procedure Laterality Date     SECTION      CHOLECYSTECTOMY  2016    ENDOSCOPIC RETROGRADE CHOLANGIOPANCREATOGRAPHY W/ SPHINCTEROTOMY AND STONE REMOVAL  2016     Family History   Problem Relation Age of Onset    Gallbladder disease Unknown     Skin cancer Mother     Colon cancer Father 56     Social History     Tobacco Use    Smoking status: Never    Smokeless tobacco: Never   Substance Use Topics    Alcohol use: No     Alcohol/week: 0.0 standard drinks    Drug use: No     Review of Systems   Constitutional:  Positive for chills and fatigue. Negative for fever.   HENT:  Positive for congestion.    Respiratory:  Positive for cough.    Cardiovascular: Negative.    Gastrointestinal: Negative.    All other systems reviewed and are negative.    Physical Exam     Initial Vitals [22 0823]   BP Pulse Resp Temp SpO2   134/85 70 18 98.6 °F (37 °C) 99 %      MAP       --         Physical Exam    Nursing note and vitals reviewed.  Constitutional: She appears well-developed and well-nourished. She is not diaphoretic. No distress.   HENT:   Head:  Normocephalic and atraumatic.   Eyes: EOM are normal. Pupils are equal, round, and reactive to light.   Neck: Neck supple.   Normal range of motion.  Cardiovascular:  Normal rate, regular rhythm and normal heart sounds.     Exam reveals no gallop and no friction rub.       No murmur heard.  Pulmonary/Chest: Breath sounds normal. No respiratory distress. She has no wheezes. She has no rales.   Abdominal: Abdomen is soft. Bowel sounds are normal. She exhibits no distension. There is no abdominal tenderness. There is no rebound.   Musculoskeletal:         General: No tenderness or edema. Normal range of motion.      Cervical back: Normal range of motion and neck supple.     Neurological: She is alert. She has normal strength and normal reflexes.   Skin: Skin is warm and dry.       ED Course   Procedures  Labs Reviewed   POCT INFLUENZA A/B MOLECULAR - Abnormal; Notable for the following components:       Result Value    POC Molecular Influenza A Ag Positive (*)     All other components within normal limits   SARS-COV-2 RDRP GENE    Narrative:     This test utilizes isothermal nucleic acid amplification   technology to detect the SARS-CoV-2 RdRp nucleic acid segment.   The analytical sensitivity (limit of detection) is 125 genome   equivalents/mL.   A POSITIVE result implies infection with the SARS-CoV-2 virus;   the patient is presumed to be contagious.     A NEGATIVE result means that SARS-CoV-2 nucleic acids are not   present above the limit of detection. A NEGATIVE result should be   treated as presumptive. It does not rule out the possibility of   COVID-19 and should not be the sole basis for treatment decisions.   If COVID-19 is strongly suspected based on clinical and exposure   history, re-testing using an alternate molecular assay should be   considered.   This test is only for use under the Food and Drug   Administration s Emergency Use Authorization (EUA).   Commercial kits are provided by [employer].    Performance characteristics of the EUA have been independently   verified by [facility] Department of   Pathology and Laboratory Medicine.   _________________________________________________________________   The authorized Fact Sheet for Healthcare Providers and the authorized Fact   Sheet for Patients of the ID NOW COVID-19 are available on the FDA   website:     https://www.fda.gov/media/744785/download  https://www.fda.gov/media/874826/download                 Imaging Results    None          Medications   predniSONE tablet 40 mg (40 mg Oral Given 9/26/22 0908)     Medical Decision Making:   Clinical Tests:   Lab Tests: Ordered and Reviewed                        Clinical Impression:   Final diagnoses:  [J10.1] Influenza A (Primary)        ED Disposition Condition    Discharge Stable          ED Prescriptions       Medication Sig Dispense Start Date End Date Auth. Provider    baloxavir marboxiL (XOFLUZA) 80 mg tablet (Expires today) Take 1 tablet (80 mg total) by mouth once. for 1 dose 1 tablet 9/26/2022 9/26/2022 Zachary Glover MD    promethazine-dextromethorphan (PROMETHAZINE-DM) 6.25-15 mg/5 mL Syrp Take 5 mLs by mouth every 6 (six) hours as needed (cough). 120 mL 9/26/2022 10/6/2022 Zachary Glover MD          Follow-up Information    None          Zachary Glover MD  09/26/22 8579

## 2022-09-29 ENCOUNTER — PATIENT OUTREACH (OUTPATIENT)
Dept: EMERGENCY MEDICINE | Facility: HOSPITAL | Age: 42
End: 2022-09-29
Payer: MEDICAID

## 2022-10-12 NOTE — PROGRESS NOTES
ED Navigator attempted to contact patient on 3 or more separate occasions, patient is unable to reach. ED Navigator to close encounter at this time.     Tori Dowd  ED Navigator- [place]

## 2023-02-01 DIAGNOSIS — Z12.31 ENCOUNTER FOR SCREENING MAMMOGRAM FOR MALIGNANT NEOPLASM OF BREAST: Primary | ICD-10-CM
